# Patient Record
Sex: FEMALE | Race: WHITE | Employment: FULL TIME | URBAN - METROPOLITAN AREA
[De-identification: names, ages, dates, MRNs, and addresses within clinical notes are randomized per-mention and may not be internally consistent; named-entity substitution may affect disease eponyms.]

---

## 2019-01-28 ENCOUNTER — OFFICE VISIT (OUTPATIENT)
Dept: ENDOCRINOLOGY | Age: 34
End: 2019-01-28

## 2019-01-28 VITALS
SYSTOLIC BLOOD PRESSURE: 119 MMHG | HEART RATE: 90 BPM | WEIGHT: 168 LBS | BODY MASS INDEX: 28.68 KG/M2 | DIASTOLIC BLOOD PRESSURE: 67 MMHG | HEIGHT: 64 IN

## 2019-01-28 DIAGNOSIS — O99.280 HYPERTHYROIDISM AFFECTING PREGNANCY, ANTEPARTUM: Primary | ICD-10-CM

## 2019-01-28 DIAGNOSIS — E05.90 HYPERTHYROIDISM AFFECTING PREGNANCY, ANTEPARTUM: Primary | ICD-10-CM

## 2019-01-28 RX ORDER — DOXYLAMINE SUCCINATE AND PYRIDOXINE HYDROCHLORIDE 10; 10 MG/1; MG/1
TABLET, DELAYED RELEASE ORAL
Refills: 4 | COMMUNITY
Start: 2019-01-17 | End: 2021-05-24 | Stop reason: ALTCHOICE

## 2019-01-28 NOTE — PROGRESS NOTES
CONSULTATION REQUESTED BY: No primary care provider on file. REASON FOR CONSULT: Evaluation of hyperthyroidism    CHIEF COMPLAINT: Hyperthyroidism    HISTORY OF PRESENT ILLNESS:   Maruisz Crowe is a 34 y.o. female with a PMHx as noted below who was referred to our endocrinology clinic for evaluation of Hyperthyroidism. Patient describes that 18 weeks pregnant,  She notes she did have symptoms similar to hyperemesis gravidarum,   Patient reports a high degree of vomiting several weeks before,   She is on an entiemetic currently, noting her symptoms are improving,  Patient has no personal history of prior thyroid disorders,  Notably family history for thyroid disorders is positive in mother/aunt for graves/hashimoto thyroiditis,  Patient denies recent illness. She denies the use of any steroid or opioid medications. Patient denies palpitations, tremors, she did in the past however improved,  Lost weight when she was more ill a number of weeks back starting to [facility],  Blood pressure and heart rate today are normal.   Patient appears clinically euthyroid. Patient has no further concerns and is hoping to learn what is causing this thyroid dysfunction. Prior outside labs reviewed:   11/28/18: TSH 0.005  12/31/18: TSH 0.005    Review of most recent thyroid function:  No results found for: TSH, FT4, 445146, T3LT, 563016, TSILT, TRALT, TMCLT, TSHEXT   Thyroid Lab Key:  TSILT = Thyroid stimulating antibodies  TRALT = TSH Receptor Antibodies  TMCLT = TPO antibodies  T3LT = Total T3 levels  908335 = Direct FT4  512214 = Free T3    PAST MEDICAL/SURGICAL HISTORY:   No past medical history on file. No past surgical history on file.     ALLERGIES:   Allergies   Allergen Reactions    Pcn [Penicillins] Hives       MEDICATIONS ON ADMISSION:     Current Outpatient Medications:     DICLEGIS 10-10 mg TbEC DR tablet, TAKE 2 TABLETS BY MOUTH EVERY DAY AT BEDTIME ON EMPTY STOMACH, Disp: , Rfl: 4    prenatal vit calc,iron,folic (PRENATAL VITAMIN PO), Take  by mouth., Disp: , Rfl:     SOCIAL HISTORY:   Social History     Socioeconomic History    Marital status:      Spouse name: Not on file    Number of children: Not on file    Years of education: Not on file    Highest education level: Not on file   Social Needs    Financial resource strain: Not on file    Food insecurity - worry: Not on file    Food insecurity - inability: Not on file   [employer] needs - medical: Not on file   [employer] needs - non-medical: Not on file   Occupational History    Not on file   Tobacco Use    Smoking status: Never Smoker    Smokeless tobacco: Never Used   Substance and Sexual Activity    Alcohol use: No     Frequency: Never    Drug use: Not on file    Sexual activity: Not on file   Other Topics Concern    Not on file   Social History Narrative    Not on file       FAMILY HISTORY:  Family History   Problem Relation Age of Onset    Heart Disease Maternal Grandfather     Heart Disease Paternal Grandfather     Thyroid Disease Mother     Thyroid Disease Maternal Aunt        REVIEW OF SYSTEMS: Complete ROS assessed and noted for that which is described above, all else are negative. Eyes: normal  ENT: normal  CVS: normal  Resp: normal  GI: normal  : normal  GYN: normal  Endocrine: normal  Integument: normal  Musculoskeletal: normal  Neuro: normal  Psych: normal      PHYSICAL EXAMINATION:    VITAL SIGNS:  Visit Vitals  /67 (BP 1 Location: Left arm, BP Patient Position: Sitting)   Pulse 90   Ht 5' 4\" (1.626 m)   Wt 168 lb (76.2 kg)   BMI 28.84 kg/m²       GENERAL: NCAT, Sitting comfortably, NAD  EYES: EOMI, non-icteric, no proptosis  Ear/Nose/Throat: NCAT, no inflammation, thyroid gland is smooth and not enlarged, nodularity is not felt on palpation.    LYMPH NODES: No LAD  CARDIOVASCULAR: S1 S2, RRR, No murmur, 2+ radial pulses  RESPIRATORY: CTA b/l, no wheeze/rales  GASTROINTESTINAL: soft, NT, ND  MUSCULOSKELETAL: Normal ROM, no atrophy  SKIN: warm, no edema/rash/ or other skin changes  NEUROLOGIC: 5/5 power all extremities, AAOx3, no tremor   PSYCHIATRIC: Normal affect, Normal insight and judgement      REVIEW OF LABORATORY AND RADIOLOGY DATA:   Labs and documentation have been reviewed as described above. ASSESSMENT AND PLAN:   Jamin Tan is a 34 y.o. female with a PMHx as noted above who was referred to our endocrinology clinic for evaluation of hyperthyroidism. Hyperthyroidism affecting pregnancy    Based on the patients symptoms and thyroid function which I have reviewed, it does suggest hyperthyroidism. We have reviewed the general causes of hyperthyroidism together which include subacute thyroiditis, graves disease, and toxic nodules. We also discussed the general workup which is may involve confirming the abnormal labs, checking for antibodies, etc. We also discussed the various treatments. * In their particular case, she is noted for symptoms that are suggestive of hyperemesis gravidarum, notably also on an antiemetic. She describes that her symptoms are improving, while her blood pressure and heart rate today are normal. Clinically she appears euthyroid. Suspicion for physiologic hyperthyroidism of pregnancy is highest on the differential diagnosis list and we reviewed the causes for this using a drawing. We did discuss the need to evaluate for autoimmune thyroid disease also considering she has both graves disease and hashimoto disease in her family. Today we will obtain:  TSH, FT4, FT3, Thyroid receptor antibodies  I have advised the patient that based on these results which I will discuss with them by phone, we will determine the best next step to take. Blood pressure and heart rate are currently normal, no indication for beta blocker at this time. Plan to have patient RTC in 6 weeks, pre-labs to be ordered depending on findings and plan,    Rahat Page MD  [address] [place] [address]

## 2019-01-28 NOTE — PATIENT INSTRUCTIONS
Thyroid levels and antibodies today,     Plan for a follow up in about 6 weeks,     ( March 13 at 3:50 PM )    Call with concerns,     Paige Mccullough.  39 [facility]  [facility]

## 2019-01-29 LAB
T3FREE SERPL-MCNC: 6.2 PG/ML (ref 2–4.4)
T4 FREE SERPL-MCNC: 2.07 NG/DL (ref 0.82–1.77)
THYROGLOB AB SERPL-ACNC: 10.8 IU/ML (ref 0–0.9)
THYROPEROXIDASE AB SERPL-ACNC: 99 IU/ML (ref 0–34)
TSH SERPL DL<=0.005 MIU/L-ACNC: <0.006 UIU/ML (ref 0.45–4.5)
TSI ACT/NOR SER: 5.51 IU/L (ref 0–0.55)

## 2019-01-30 DIAGNOSIS — E05.00 GRAVES DISEASE: Primary | ICD-10-CM

## 2019-01-30 RX ORDER — METHIMAZOLE 10 MG/1
10 TABLET ORAL DAILY
Qty: 90 TAB | Refills: 3 | Status: SHIPPED | OUTPATIENT
Start: 2019-01-30 | End: 2019-05-03 | Stop reason: ALTCHOICE

## 2019-01-30 NOTE — PROGRESS NOTES
Thyroid levels elevated, FT4 2.07, FT3 6.2, TSH <0.006,  TSI + suggestive of graves disease,   TPO and TgAb positive also,   Recommend starting with 10mg of methimazole once daily, ideally to keep thyroid hormone levels on the higher side of normal, and avoid potential thyroid suppression,   Will have her repeat her levels at the lab in 4 weeks,   I called patient to advise her, she demonstrated her understanding,    Diego Stewart.  39 [facility] Endocrinology  [address] [place] [address]

## 2019-02-01 ENCOUNTER — TELEPHONE (OUTPATIENT)
Dept: ENDOCRINOLOGY | Age: 34
End: 2019-02-01

## 2019-02-01 NOTE — TELEPHONE ENCOUNTER
I returned this call and was able to answer some of her questions based on chart notes. However she wanted to know if taking 10 mg of Methimazole is ok as she is 18 weeks pregnant. I told her I would pass this on to Dr. Kerry Garcia and get back to her.   Rosetta Chang

## 2019-02-01 NOTE — TELEPHONE ENCOUNTER
Methimazole can be used safely with monitoring at her stage of pregnancy. Thanks,     Yanelis Landis.  39 [facility]  [facility]

## 2019-02-01 NOTE — TELEPHONE ENCOUNTER
I called Ms. Abdalla back and relayed the message from Dr. Leonora Salinas. She understood the information.   Melody Watkins

## 2019-02-01 NOTE — TELEPHONE ENCOUNTER
Patient called to ask some questions about taking Methimazole. She is pregnant and has some concerns. She can be reached at:  [phone].

## 2019-03-07 LAB
T3FREE SERPL-MCNC: 4.5 PG/ML (ref 2–4.4)
T4 FREE SERPL-MCNC: 1.55 NG/DL (ref 0.82–1.77)
TSH SERPL DL<=0.005 MIU/L-ACNC: <0.006 UIU/ML (ref 0.45–4.5)

## 2019-03-13 ENCOUNTER — OFFICE VISIT (OUTPATIENT)
Dept: ENDOCRINOLOGY | Age: 34
End: 2019-03-13

## 2019-03-13 VITALS
BODY MASS INDEX: 30.52 KG/M2 | HEIGHT: 64 IN | WEIGHT: 178.8 LBS | DIASTOLIC BLOOD PRESSURE: 75 MMHG | HEART RATE: 81 BPM | SYSTOLIC BLOOD PRESSURE: 175 MMHG

## 2019-03-13 DIAGNOSIS — E05.90 HYPERTHYROIDISM AFFECTING PREGNANCY, ANTEPARTUM: Primary | ICD-10-CM

## 2019-03-13 DIAGNOSIS — O99.280 HYPERTHYROIDISM AFFECTING PREGNANCY, ANTEPARTUM: Primary | ICD-10-CM

## 2019-03-13 NOTE — PROGRESS NOTES
CHIEF COMPLAINT: f/u evaluation for hyperthyroidism. HISTORY OF PRESENT ILLNESS:   Kadeem Sanchez is a 34 y.o. female with a PMHx as noted below who presents to the endocrinology clinic for f/u evaluation of hyperthyroidism. Presented for evaluation at 18 weeks gestation,  + TSI / TPO / TgAb 's, suggestive of graves disease,  We started her on 10 mg of methimazole once daily,  She has taken this as directed,  Thyroid levels are improving, however remains a bit elevated,  We had discussed previously aiming for upper limits of normal,  She reports feeling well, has no new concerns,  Denies symptoms of hyper or hypothyroidism,  Her palpitations have improved    Component      Latest Ref Rng & Units 3/6/2019 3/6/2019 3/6/2019          12:05 PM 12:05 PM 12:05 PM   TSH      0.450 - 4.500 uIU/mL   <0.006 (L)   T4, Free      0.82 - 1.77 ng/dL  1.55    Triiodothyronine (T3), free      2.0 - 4.4 pg/mL 4.5 (H)         PAST MEDICAL/SURGICAL HISTORY:   No past medical history on file. No past surgical history on file. ALLERGIES:   Allergies   Allergen Reactions    Pcn [Penicillins] Hives       MEDICATIONS ON ADMISSION:     Current Outpatient Medications:     methIMAzole (TAPAZOLE) 10 mg tablet, Take 1 Tab by mouth daily. , Disp: 90 Tab, Rfl: 3    prenatal vit calc,iron,folic (PRENATAL VITAMIN PO), Take  by mouth., Disp: , Rfl:     DICLEGIS 10-10 mg TbEC DR tablet, TAKE 2 TABLETS BY MOUTH EVERY DAY AT BEDTIME ON EMPTY STOMACH, Disp: , Rfl: 4    SOCIAL HISTORY:   Social History     Socioeconomic History    Marital status:      Spouse name: Not on file    Number of children: Not on file    Years of education: Not on file    Highest education level: Not on file   Social Needs    Financial resource strain: Not on file    Food insecurity - worry: Not on file    Food insecurity - inability: Not on file   [employer] needs - medical: Not on file   [employer] needs - non-medical: Not on file Occupational History    Not on file   Tobacco Use    Smoking status: Never Smoker    Smokeless tobacco: Never Used   Substance and Sexual Activity    Alcohol use: No     Frequency: Never    Drug use: Not on file    Sexual activity: Not on file   Other Topics Concern    Not on file   Social History Narrative    Not on file       FAMILY HISTORY:  Family History   Problem Relation Age of Onset    Heart Disease Maternal Grandfather     Heart Disease Paternal Grandfather     Thyroid Disease Mother     Thyroid Disease Maternal Aunt        REVIEW OF SYSTEMS: Complete ROS assessed and noted for that which is described above, all else are negative. Eyes: normal  ENT: normal  CVS: normal  Resp: normal  GI: normal  : normal  GYN: normal  Endocrine: normal  Integument: normal  Musculoskeletal: normal  Neuro: normal  Psych: normal      PHYSICAL EXAMINATION:    VITAL SIGNS:  Visit Vitals  /75   Pulse 81   Ht 5' 4\" (1.626 m)   Wt 178 lb 12.8 oz (81.1 kg)   BMI 30.69 kg/m²       GENERAL: NCAT, Sitting comfortably, NAD  EYES: EOMI, non-icteric, no proptosis  Ear/Nose/Throat: NCAT, no inflammation, thyroid gland enlarged  LYMPH NODES: No LAD  CARDIOVASCULAR: S1 S2, RRR, No murmur, 2+ radial pulses  RESPIRATORY: CTA b/l, no wheeze/rales  GASTROINTESTINAL: soft, NT, ND,  MUSCULOSKELETAL: Normal ROM, no atrophy  SKIN: warm, no edema/rash/ or other skin changes  NEUROLOGIC: 5/5 power all extremities, no tremors, AAOx3  PSYCHIATRIC: Normal affect, Normal insight and judgement    REVIEW OF LABORATORY AND RADIOLOGY DATA:   Labs and documentation have been reviewed as described above. ASSESSMENT AND PLAN:   Jewell Fink is a 34 y.o. female with a PMHx as noted above who presents to the endocrinology clinic for f/u evaluation of hyperthyroidism.      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 FT3 level still borderline elevated and we will increase her methimazole by just half a dose and repeat in about 6 weeks. She was welcomed to call with concerns. Increase methimazole to 15mg (1.5 tabs) each day,   F/u in 6 weeks with prelabs, to schedule next 3 visits today,    Rick Ozuna.  [address] [facility]

## 2019-03-13 NOTE — PATIENT INSTRUCTIONS
Increase methimazole to 15mg (1 and half tablets) each morning. Plan to return in about 6 weeks, with blood work 1 week before. Visit: MAY 3rd at 4:10 PM + 2 more appt's 6 weeks apart. Paige Mccullough.  39 [facility] Endocrin[facility]  [address] [place] [address]

## 2019-03-14 ENCOUNTER — OFFICE VISIT (OUTPATIENT)
Dept: URGENT CARE | Age: 34
End: 2019-03-14

## 2019-03-14 VITALS
RESPIRATION RATE: 18 BRPM | HEIGHT: 64 IN | SYSTOLIC BLOOD PRESSURE: 109 MMHG | DIASTOLIC BLOOD PRESSURE: 65 MMHG | TEMPERATURE: 98.5 F | HEART RATE: 79 BPM | WEIGHT: 180.9 LBS | OXYGEN SATURATION: 98 % | BODY MASS INDEX: 30.88 KG/M2

## 2019-03-14 DIAGNOSIS — Z20.828 EXPOSURE TO THE FLU: Primary | ICD-10-CM

## 2019-03-14 RX ORDER — OSELTAMIVIR PHOSPHATE 75 MG/1
75 CAPSULE ORAL DAILY
Qty: 10 CAP | Refills: 0 | Status: SHIPPED | OUTPATIENT
Start: 2019-03-14 | End: 2019-03-24

## 2019-03-14 NOTE — PROGRESS NOTES
Temi who is 24 weeks pregnant presents for Tamiflu prophylaxis treatment. Reports daughter dx'ed with Influenza yesterday, had 1 day of flu sx. Pt denies cough, congestion, runny nose, body aches, chills. Feels well. The history is provided by the patient. History reviewed. No pertinent past medical history. History reviewed. No pertinent surgical history. Family History   Problem Relation Age of Onset    Heart Disease Maternal Grandfather     Heart Disease Paternal Grandfather     Thyroid Disease Mother     Thyroid Disease Maternal Aunt         Social History     Socioeconomic History    Marital status:      Spouse name: Not on file    Number of children: Not on file    Years of education: Not on file    Highest education level: Not on file   Social Needs    Financial resource strain: Not on file    Food insecurity - worry: Not on file    Food insecurity - inability: Not on file   [employer] needs - medical: Not on file   [employer] needs - non-medical: Not on file   Occupational History    Not on file   Tobacco Use    Smoking status: Never Smoker    Smokeless tobacco: Never Used   Substance and Sexual Activity    Alcohol use: No     Frequency: Never    Drug use: Not on file    Sexual activity: Not on file   Other Topics Concern    Not on file   Social History Narrative    Not on file                ALLERGIES: Pcn [penicillins]    Review of Systems   Constitutional: Negative for activity change, appetite change, chills and fever. HENT: Negative for congestion, ear pain, rhinorrhea, sinus pressure, sinus pain, sore throat and trouble swallowing. Respiratory: Negative for cough, shortness of breath and wheezing. Cardiovascular: Negative for chest pain and palpitations. Gastrointestinal: Negative for nausea and vomiting. Musculoskeletal: Negative for myalgias. Neurological: Negative for dizziness and headaches.    Hematological: Negative for adenopathy. Vitals:    03/14/19 1150   BP: 109/65   Pulse: 79   Resp: 18   Temp: 98.5 °F (36.9 °C)   SpO2: 98%   Weight: 180 lb 14.4 oz (82.1 kg)   Height: 5' 4\" (1.626 m)       Physical Exam   Constitutional: She appears well-developed and well-nourished. No distress. Cardiovascular: Normal rate, regular rhythm and normal heart sounds. Pulmonary/Chest: Effort normal and breath sounds normal. No respiratory distress. She has no wheezes. She has no rales. Neurological: She is alert. Skin: She is not diaphoretic. Psychiatric: She has a normal mood and affect. Her behavior is normal. Judgment and thought content normal.   Nursing note and vitals reviewed. [facility]    ICD-10-CM ICD-9-CM    1. Exposure to the flu Z20.828 V01.79      Medications Ordered Today   Medications    oseltamivir (TAMIFLU) 75 mg capsule     Sig: Take 1 Cap by mouth daily for 10 days. Dispense:  10 Cap     Refill:  0     Fluids  Hand hygiene     The patient is to follow up with PCP prn. The patient is to take medications as prescribed.              Procedures

## 2019-04-27 LAB
T3FREE SERPL-MCNC: 2.9 PG/ML (ref 2–4.4)
T4 FREE SERPL-MCNC: 1.03 NG/DL (ref 0.82–1.77)
TSH SERPL DL<=0.005 MIU/L-ACNC: 0.01 UIU/ML (ref 0.45–4.5)

## 2019-05-03 ENCOUNTER — OFFICE VISIT (OUTPATIENT)
Dept: ENDOCRINOLOGY | Age: 34
End: 2019-05-03

## 2019-05-03 VITALS
BODY MASS INDEX: 32.95 KG/M2 | WEIGHT: 193 LBS | HEART RATE: 96 BPM | HEIGHT: 64 IN | DIASTOLIC BLOOD PRESSURE: 74 MMHG | SYSTOLIC BLOOD PRESSURE: 113 MMHG

## 2019-05-03 DIAGNOSIS — E05.90 HYPERTHYROIDISM AFFECTING PREGNANCY, ANTEPARTUM: Primary | ICD-10-CM

## 2019-05-03 DIAGNOSIS — O99.280 HYPERTHYROIDISM AFFECTING PREGNANCY, ANTEPARTUM: Primary | ICD-10-CM

## 2019-05-03 RX ORDER — METHIMAZOLE 5 MG/1
12.5 TABLET ORAL DAILY
Qty: 180 TAB | Refills: 3 | Status: SHIPPED | OUTPATIENT
Start: 2019-05-03 | End: 2020-02-21 | Stop reason: SDUPTHER

## 2019-05-03 NOTE — PROGRESS NOTES
CHIEF COMPLAINT: f/u evaluation for hyperthyroidism. HISTORY OF PRESENT ILLNESS:   Abby Jay is a 34 y.o. female with a PMHx as noted below who presents to the endocrinology clinic for f/u evaluation of hyperthyroidism. Presented for evaluation at 31 weeks gestation,  Will be having a baby girl,  + TSI / TPO / TgAb 's, suggestive of graves disease,  We started her on 10 mg of methimazole once daily,  On the last visit we increased her dose to 15mg once daily,  We had discussed previously aiming for upper limits of normal,  She reports feeling well, has no new concerns,  Denies symptoms of hyper or hypothyroidism,  Thyroid hormone levels are normal and stable, TSH is lagging behind,  No recent OB ultrasounds,    Component      Latest Ref Rng & Units 4/26/2019 4/26/2019 4/26/2019           8:31 AM  8:31 AM  8:31 AM   TSH      0.450 - 4.500 uIU/mL   0.009 (L)   T4, Free      0.82 - 1.77 ng/dL  1.03    Triiodothyronine (T3), free      2.0 - 4.4 pg/mL 2.9         PAST MEDICAL/SURGICAL HISTORY:   No past medical history on file. No past surgical history on file. ALLERGIES:   Allergies   Allergen Reactions    Pcn [Penicillins] Hives       MEDICATIONS ON ADMISSION:     Current Outpatient Medications:     methIMAzole (TAPAZOLE) 10 mg tablet, Take 1 Tab by mouth daily. (Patient taking differently: Take 15 mg by mouth daily.  Indications: overactive thyroid gland), Disp: 90 Tab, Rfl: 3    prenatal vit calc,iron,folic (PRENATAL VITAMIN PO), Take  by mouth., Disp: , Rfl:     DICLEGIS 10-10 mg TbEC DR tablet, TAKE 2 TABLETS BY MOUTH EVERY DAY AT BEDTIME ON EMPTY STOMACH, Disp: , Rfl: 4    SOCIAL HISTORY:   Social History     Socioeconomic History    Marital status:      Spouse name: Not on file    Number of children: Not on file    Years of education: Not on file    Highest education level: Not on file   Occupational History    Not on file   Social Needs    Financial resource strain: Not on file   Velta Ganser Food insecurity:     Worry: Not on file     Inability: Not on file    Transportation needs:     Medical: Not on file     Non-medical: Not on file   Tobacco Use    Smoking status: Never Smoker    Smokeless tobacco: Never Used   Substance and Sexual Activity    Alcohol use: No     Frequency: Never    Drug use: Not on file    Sexual activity: Not on file   Lifestyle    Physical activity:     Days per week: Not on file     Minutes per session: Not on file    Stress: Not on file   Relationships    Social connections:     Talks on phone: Not on file     Gets together: Not on file     Attends Denominational service: Not on file     Active member of club or organization: Not on file     Attends meetings of clubs or organizations: Not on file     Relationship status: Not on file    Intimate partner violence:     Fear of current or ex partner: Not on file     Emotionally abused: Not on file     Physically abused: Not on file     Forced sexual activity: Not on file   Other Topics Concern    Not on file   Social History Narrative    Not on file       FAMILY HISTORY:  Family History   Problem Relation Age of Onset    Heart Disease Maternal Grandfather     Heart Disease Paternal Grandfather     Thyroid Disease Mother     Thyroid Disease Maternal Aunt        REVIEW OF SYSTEMS: Complete ROS assessed and noted for that which is described above, all else are negative.   Eyes: normal  ENT: normal  CVS: normal  Resp: normal  GI: normal  : normal  GYN: normal  Endocrine: normal  Integument: normal  Musculoskeletal: normal  Neuro: normal  Psych: normal      PHYSICAL EXAMINATION:    VITAL SIGNS:  Visit Vitals  /74 (BP 1 Location: Left arm, BP Patient Position: Sitting)   Pulse 96   Ht 5' 4\" (1.626 m)   Wt 193 lb (87.5 kg)   BMI 33.13 kg/m²       GENERAL: NCAT, Sitting comfortably, NAD  EYES: EOMI, non-icteric, no proptosis  Ear/Nose/Throat: NCAT, no inflammation, thyroid gland enlarged  LYMPH NODES: No LAD  CARDIOVASCULAR: S1 S2, RRR, No murmur, 2+ radial pulses  RESPIRATORY: CTA b/l, no wheeze/rales  GASTROINTESTINAL: soft, NT, ND,  MUSCULOSKELETAL: Normal ROM, no atrophy  SKIN: warm, no edema/rash/ or other skin changes  NEUROLOGIC: 5/5 power all extremities, no tremors, AAOx3  PSYCHIATRIC: Normal affect, Normal insight and judgement    REVIEW OF LABORATORY AND RADIOLOGY DATA:   Labs and documentation have been reviewed as described above. ASSESSMENT AND PLAN:   Cynthia Singh is a 34 y.o. female with a PMHx as noted above who presents to the endocrinology clinic for f/u evaluation of hyperthyroidism. 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 We would like to avoid her thyroid levels coming down to the lower end of normal however and prefer them to be near the upper end of normal, and so we will plan as follows. Continue current dose of 15mg daily for 2 more weeks,   Then reduce to 12.5mg daily thereafter. ( 10mg tab + 1/2 of a 5mg tab ok,   Or 2.5 tabs of the 5mg tabs). New 5mg tabs ordered,  Patient can let us know if she cannot make the next appointment due to delivery,  Pre-labs ordered,     Dima MARSH  [address] [facility]

## 2019-05-03 NOTE — PATIENT INSTRUCTIONS
Continue current dose of 15mg daily for 2 more weeks,   Then reduce to 12.5mg daily thereafter. ( 10mg tab + 1/2 of a 5mg tab ok,   Or 2.5 tabs of the 5mg tabs). New 5mg tabs ordered,       Jhon Mendoza.  [address] [place] [address]

## 2019-06-08 LAB
T3FREE SERPL-MCNC: 2.2 PG/ML (ref 2–4.4)
T4 FREE SERPL-MCNC: 0.85 NG/DL (ref 0.82–1.77)
TSH SERPL DL<=0.005 MIU/L-ACNC: 1.71 UIU/ML (ref 0.45–4.5)

## 2019-06-14 ENCOUNTER — OFFICE VISIT (OUTPATIENT)
Dept: ENDOCRINOLOGY | Age: 34
End: 2019-06-14

## 2019-06-14 VITALS
WEIGHT: 197.2 LBS | HEART RATE: 79 BPM | DIASTOLIC BLOOD PRESSURE: 63 MMHG | SYSTOLIC BLOOD PRESSURE: 119 MMHG | BODY MASS INDEX: 33.67 KG/M2 | HEIGHT: 64 IN

## 2019-06-14 DIAGNOSIS — O99.280 HYPERTHYROIDISM AFFECTING PREGNANCY, ANTEPARTUM: Primary | ICD-10-CM

## 2019-06-14 DIAGNOSIS — E05.90 HYPERTHYROIDISM AFFECTING PREGNANCY, ANTEPARTUM: Primary | ICD-10-CM

## 2019-06-14 NOTE — PATIENT INSTRUCTIONS
Reduce methimazole to 10mg daily (5mg tabs x2) OR (10mg tab x1) for 3 weeks,     Then reduce further to 1 and a half tablets (7.5mg) daily thereafter, (using the 5mg tabs)    Can check thyroid labs post-partum in the hospital to assure stable TSH,    See you back on your next visit,       Hannah Mcintyre.  [address] [facility]  [address] [place] [address]

## 2019-06-14 NOTE — PROGRESS NOTES
CHIEF COMPLAINT: f/u evaluation for hyperthyroidism. HISTORY OF PRESENT ILLNESS:   Gisselle Horne is a 34 y.o. female with a PMHx as noted below who presents to the endocrinology clinic for f/u evaluation of hyperthyroidism. Presented for evaluation at 31 weeks gestation,  Will be having a baby girl, her 3rd child.   + TSI / TPO / TgAb 's, suggestive of graves disease,  We started her on methimazole with taper. Currently at 37 weeks gestation, Due date is July 1st,  On the last visit I provided her with the following instructions:   Continue current dose of 15mg daily for 2 more weeks,    Then reduce to 12.5mg daily thereafter. ( 10mg tab + 1/2 of a 5mg tab ok,   Or 2.5 tabs of the 5mg tabs). New 5mg tabs ordered,  We had discussed previously aiming for upper limits of normal,  She reports feeling well, has no new concerns,  Taking the 12.5mg daily we had discussed,  Denies symptoms of hyper or hypothyroidism,  Thyroid hormone levels are normal and stable,    Review of most recent thyroid function:  Lab Results   Component Value Date    TSH 1.710 06/07/2019    TSH 0.009 (L) 04/26/2019    TSH <0.006 (L) 03/06/2019    FT4 0.85 06/07/2019    FT4 1.03 04/26/2019    FT4 1.55 03/06/2019    TSILT 5.51 (H) 01/28/2019    TMCLT 99 (H) 01/28/2019    TGAB 10.8 (H) 01/28/2019      Thyroid Lab Key:  TSILT = Thyroid stimulating antibodies  TRALT = TSH Receptor Antibodies  TMCLT = TPO antibodies  T3LT = Total T3 levels  [id number] = Direct FT4  [id number] = Free T3      PAST MEDICAL/SURGICAL HISTORY:   No past medical history on file. No past surgical history on file. ALLERGIES:   Allergies   Allergen Reactions    Pcn [Penicillins] Hives       MEDICATIONS ON ADMISSION:     Current Outpatient Medications:     methIMAzole (TAPAZOLE) 5 mg tablet, Take 2.5 Tabs by mouth daily.  Indications: overactive thyroid gland, Disp: 180 Tab, Rfl: 3    prenatal vit calc,iron,folic (PRENATAL VITAMIN PO), Take  by mouth., Disp: , Rfl:   DICLEGIS 10-10 mg TbEC DR tablet, TAKE 2 TABLETS BY MOUTH EVERY DAY AT BEDTIME ON EMPTY STOMACH, Disp: , Rfl: 4    SOCIAL HISTORY:   Social History     Socioeconomic History    Marital status:      Spouse name: Not on file    Number of children: Not on file    Years of education: Not on file    Highest education level: Not on file   Occupational History    Not on file   Social Needs    Financial resource strain: Not on file    Food insecurity:     Worry: Not on file     Inability: Not on file    Transportation needs:     Medical: Not on file     Non-medical: Not on file   Tobacco Use    Smoking status: Never Smoker    Smokeless tobacco: Never Used   Substance and Sexual Activity    Alcohol use: No     Frequency: Never    Drug use: Not on file    Sexual activity: Not on file   Lifestyle    Physical activity:     Days per week: Not on file     Minutes per session: Not on file    Stress: Not on file   Relationships    Social connections:     Talks on phone: Not on file     Gets together: Not on file     Attends Restorationism service: Not on file     Active member of club or organization: Not on file     Attends meetings of clubs or organizations: Not on file     Relationship status: Not on file    Intimate partner violence:     Fear of current or ex partner: Not on file     Emotionally abused: Not on file     Physically abused: Not on file     Forced sexual activity: Not on file   Other Topics Concern    Not on file   Social History Narrative    Not on file       FAMILY HISTORY:  Family History   Problem Relation Age of Onset    Heart Disease Maternal Grandfather     Heart Disease Paternal Grandfather     Thyroid Disease Mother     Thyroid Disease Maternal Aunt        REVIEW OF SYSTEMS: Complete ROS assessed and noted for that which is described above, all else are negative.   Eyes: normal  ENT: normal  CVS: normal  Resp: normal  GI: normal  : normal  GYN: normal  Endocrine: normal  Integument: normal  Musculoskeletal: normal  Neuro: normal  Psych: normal      PHYSICAL EXAMINATION:    VITAL SIGNS:  Visit Vitals  /63   Pulse 79   Ht 5' 4\" (1.626 m)   Wt 197 lb 3.2 oz (89.4 kg)   BMI 33.85 kg/m²       GENERAL: NCAT, Sitting comfortably, NAD  EYES: EOMI, non-icteric, no proptosis  Ear/Nose/Throat: NCAT, no inflammation, thyroid gland mildly enlarged  LYMPH NODES: No LAD  CARDIOVASCULAR: S1 S2, RRR, No murmur, 2+ radial pulses  RESPIRATORY: CTA b/l, no wheeze/rales  GASTROINTESTINAL: soft, NT, ND,  MUSCULOSKELETAL: Normal ROM, no atrophy  SKIN: warm, no edema/rash/ or other skin changes  NEUROLOGIC: 5/5 power all extremities, no tremors, AAOx3  PSYCHIATRIC: Normal affect, Normal insight and judgement    REVIEW OF LABORATORY AND RADIOLOGY DATA:   Labs and documentation have been reviewed as described above. ASSESSMENT AND PLAN:   Jessica Cortes is a 34 y.o. female with a PMHx as noted above who presents to the endocrinology clinic for f/u evaluation of hyperthyroidism. 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 We discussed the use of methimazole in breast feeding mothers. We discussed our plan for surveillance and treatment in the post partum period in general.    Reduce methimazole to 10mg daily (5mg tabs x2) OR (10mg tab x1) for 3 weeks,   Then reduce further to 1 and a half tablets (7.5mg) daily thereafter,  Can check thyroid labs post-partum in the hospital to assure stable TSH,    Pre-labs ordered,    25 minutes spent together with patient today of which >50% of this time was spent in counseling and coordination of care. Favian Alvarez.  [address] [facility]

## 2019-07-23 LAB
T3 SERPL-MCNC: 106 NG/DL (ref 71–180)
T4 FREE SERPL-MCNC: 1 NG/DL (ref 0.82–1.77)
TSH SERPL DL<=0.005 MIU/L-ACNC: 0.04 UIU/ML (ref 0.45–4.5)

## 2019-07-26 ENCOUNTER — OFFICE VISIT (OUTPATIENT)
Dept: ENDOCRINOLOGY | Age: 34
End: 2019-07-26

## 2019-07-26 VITALS
DIASTOLIC BLOOD PRESSURE: 67 MMHG | HEIGHT: 64 IN | WEIGHT: 181.9 LBS | HEART RATE: 67 BPM | RESPIRATION RATE: 12 BRPM | BODY MASS INDEX: 31.05 KG/M2 | SYSTOLIC BLOOD PRESSURE: 108 MMHG | OXYGEN SATURATION: 98 %

## 2019-07-26 DIAGNOSIS — E05.90 HYPERTHYROIDISM AFFECTING PREGNANCY, ANTEPARTUM: Primary | ICD-10-CM

## 2019-07-26 DIAGNOSIS — O99.280 HYPERTHYROIDISM AFFECTING PREGNANCY, ANTEPARTUM: Primary | ICD-10-CM

## 2019-07-26 NOTE — PROGRESS NOTES
Tyler Manzanares is a 34 y.o. female    Chief Complaint   Patient presents with    Thyroid Problem    Follow-up     1. Have you been to the ER, urgent care clinic since your last visit? Hospitalized since your last visit? No    2. Have you seen or consulted any other health care providers outside of the [address] [place] [address] since your last visit? Include any pap smears or colon screening.  No

## 2019-07-26 NOTE — PROGRESS NOTES
CHIEF COMPLAINT: f/u evaluation for hyperthyroidism. HISTORY OF PRESENT ILLNESS:   Irene Aguilar is a 34 y.o. female with a PMHx as noted below who presents to the endocrinology clinic for f/u evaluation of hyperthyroidism.    + TSI / TPO / TgAb 's, suggestive of graves disease, in pregnancy,  We started her on methimazole with taper. Had her baby girl June 25th, Zoe,  On the last visit I provided her with the following instructions:   Reduce methimazole to 10mg daily (5mg tabs x2) OR (10mg tab x1) for 3 weeks,    Then reduce further to 1 and a half tablets (7.5mg) daily thereafter,  Review of labs shows low TSH but controlled FT4/T3 which does not well matched,  Patient reports that it is possible that a dose was missed here or there in the past 2 weeks,  She has tapered her medication as directed,  Denies symptoms of hyper or hypothyroidism,  Review of most recent thyroid function:  Lab Results   Component Value Date    TSH 0.040 (L) 07/22/2019    TSH 1.710 06/07/2019    TSH 0.009 (L) 04/26/2019    FT4 1.00 07/22/2019    FT4 0.85 06/07/2019    FT4 1.03 04/26/2019    T3LT 106 07/22/2019    TSILT 5.51 (H) 01/28/2019    TMCLT 99 (H) 01/28/2019    TGAB 10.8 (H) 01/28/2019      Thyroid Lab Key:  TSILT = Thyroid stimulating antibodies  TRALT = TSH Receptor Antibodies  TMCLT = TPO antibodies  T3LT = Total T3 levels  586997 = Direct FT4  [id number] = Free T3      PAST MEDICAL/SURGICAL HISTORY:   History reviewed. No pertinent past medical history. History reviewed. No pertinent surgical history. ALLERGIES:   Allergies   Allergen Reactions    Pcn [Penicillins] Hives       MEDICATIONS ON ADMISSION:     Current Outpatient Medications:     methIMAzole (TAPAZOLE) 5 mg tablet, Take 2.5 Tabs by mouth daily. Indications: overactive thyroid gland (Patient taking differently: Take 7.5 mg by mouth daily.  Indications: overactive thyroid gland), Disp: 180 Tab, Rfl: 3    prenatal vit calc,iron,folic (PRENATAL VITAMIN PO), Take by mouth., Disp: , Rfl:     DICLEGIS 10-10 mg TbEC DR tablet, TAKE 2 TABLETS BY MOUTH EVERY DAY AT BEDTIME ON EMPTY STOMACH, Disp: , Rfl: 4    SOCIAL HISTORY:   Social History     Socioeconomic History    Marital status:      Spouse name: Not on file    Number of children: Not on file    Years of education: Not on file    Highest education level: Not on file   Occupational History    Not on file   Social Needs    Financial resource strain: Not on file    Food insecurity:     Worry: Not on file     Inability: Not on file    Transportation needs:     Medical: Not on file     Non-medical: Not on file   Tobacco Use    Smoking status: Never Smoker    Smokeless tobacco: Never Used   Substance and Sexual Activity    Alcohol use: No     Frequency: Never    Drug use: Not on file    Sexual activity: Not on file   Lifestyle    Physical activity:     Days per week: Not on file     Minutes per session: Not on file    Stress: Not on file   Relationships    Social connections:     Talks on phone: Not on file     Gets together: Not on file     Attends Alevism service: Not on file     Active member of club or organization: Not on file     Attends meetings of clubs or organizations: Not on file     Relationship status: Not on file    Intimate partner violence:     Fear of current or ex partner: Not on file     Emotionally abused: Not on file     Physically abused: Not on file     Forced sexual activity: Not on file   Other Topics Concern    Not on file   Social History Narrative    Not on file       FAMILY HISTORY:  Family History   Problem Relation Age of Onset    Heart Disease Maternal Grandfather     Heart Disease Paternal Grandfather     Thyroid Disease Mother     Thyroid Disease Maternal Aunt        REVIEW OF SYSTEMS: Complete ROS assessed and noted for that which is described above, all else are negative.   Eyes: normal  ENT: normal  CVS: normal  Resp: normal  GI: normal  : normal  GYN: normal  Endocrine: normal  Integument: normal  Musculoskeletal: normal  Neuro: normal  Psych: normal      PHYSICAL EXAMINATION:    VITAL SIGNS:  Visit Vitals  /67 (BP 1 Location: Left arm, BP Patient Position: Sitting)   Pulse 67   Resp 12   Ht 5' 4\" (1.626 m)   Wt 181 lb 14.4 oz (82.5 kg)   SpO2 98%   Breastfeeding? Unknown   BMI 31.22 kg/m²       GENERAL: NCAT, Sitting comfortably, NAD  EYES: EOMI, non-icteric, no proptosis  Ear/Nose/Throat: NCAT, no inflammation, thyroid gland not as large as before  LYMPH NODES: No LAD  CARDIOVASCULAR: S1 S2, RRR, No murmur, 2+ radial pulses  RESPIRATORY: CTA b/l, no wheeze/rales  GASTROINTESTINAL: soft, NT, ND,  MUSCULOSKELETAL: Normal ROM, no atrophy  SKIN: warm, no edema/rash/ or other skin changes  NEUROLOGIC: 5/5 power all extremities, no tremors, AAOx3  PSYCHIATRIC: Normal affect, Normal insight and judgement    REVIEW OF LABORATORY AND RADIOLOGY DATA:   Labs and documentation have been reviewed as described above. ASSESSMENT AND PLAN:   Keisha Mancera is a 34 y.o. female with a PMHx as noted above who presents to the endocrinology clinic for f/u evaluation of hyperthyroidism. 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 She has mostly been taking her tablets as far as she can recall, though having reviewed her labs together we noticed that her thyroid levels are controlled though her TSH has come into the low range again. It is an atypical pattern which is often suggestive of a missed dose here and there in the past weeks. Often the TSH will get low due to surge of thyroid hormone when a dose may be missed, however when medication is resumed, levels can get under control with a delay in the TSH recovery presenting this way.  What we will do in this case, as she is asymptomatic and FT4/TT3 are on the lower side of normal, is repeat her levels in 3 weeks as a checkpoint and at that time we will make any further adjustments if needed. 1 and a half tablets (7.5mg) daily,  Check point lab in 3 weeks, will review and advise,   Will order prelabs for next visit after reviewing the 3 week lab,  RTC in September as discussed,    Candance Shih T.  [address] [facility]

## 2019-07-26 NOTE — PATIENT INSTRUCTIONS
1 and a half tablets (7.5mg) daily,      labs in 3 weeks, I will touch base with results,     Keep current September appt, see you then,     Nahum Albert.  [address]

## 2019-08-17 LAB
T3FREE SERPL-MCNC: 2.4 PG/ML (ref 2–4.4)
T4 FREE SERPL-MCNC: 0.97 NG/DL (ref 0.82–1.77)
TSH SERPL DL<=0.005 MIU/L-ACNC: 1.61 UIU/ML (ref 0.45–4.5)

## 2019-08-26 DIAGNOSIS — E05.90 HYPERTHYROIDISM AFFECTING PREGNANCY, ANTEPARTUM: Primary | ICD-10-CM

## 2019-08-26 DIAGNOSIS — O99.280 HYPERTHYROIDISM AFFECTING PREGNANCY, ANTEPARTUM: Primary | ICD-10-CM

## 2019-08-26 NOTE — PROGRESS NOTES
1.5 tabs of methimazole daily, labs remain normal, will notify patient through mychart and order prelabs for her next visit,     Yamile Peña.  [address] [facility]  [address] [place] [address]

## 2019-09-17 LAB
T3 SERPL-MCNC: 84 NG/DL (ref 71–180)
T4 FREE SERPL-MCNC: 1.03 NG/DL (ref 0.82–1.77)
TSH SERPL DL<=0.005 MIU/L-ACNC: 2.49 UIU/ML (ref 0.45–4.5)

## 2019-09-20 ENCOUNTER — OFFICE VISIT (OUTPATIENT)
Dept: ENDOCRINOLOGY | Age: 34
End: 2019-09-20

## 2019-09-20 VITALS
HEIGHT: 64 IN | BODY MASS INDEX: 31.07 KG/M2 | SYSTOLIC BLOOD PRESSURE: 96 MMHG | HEART RATE: 70 BPM | WEIGHT: 182 LBS | DIASTOLIC BLOOD PRESSURE: 58 MMHG

## 2019-09-20 DIAGNOSIS — E05.90 HYPERTHYROIDISM AFFECTING PREGNANCY, ANTEPARTUM: Primary | ICD-10-CM

## 2019-09-20 DIAGNOSIS — O99.280 HYPERTHYROIDISM AFFECTING PREGNANCY, ANTEPARTUM: Primary | ICD-10-CM

## 2019-09-20 RX ORDER — NORETHINDRONE 0.35 MG/1
TABLET ORAL
Refills: 3 | COMMUNITY
Start: 2019-09-03 | End: 2021-05-24 | Stop reason: ALTCHOICE

## 2019-09-20 NOTE — PROGRESS NOTES
CHIEF COMPLAINT: f/u evaluation for hyperthyroidism. HISTORY OF PRESENT ILLNESS:   Darlin Pinzon is a 30 y.o. female with a PMHx as noted below who presents to the endocrinology clinic for f/u evaluation of hyperthyroidism.    + TSI / TPO / TgAb 's, suggestive of graves disease, initially noted in pregnancy. Had her baby girl June 25th, Zoe,  We had started her on methimazole with taper. On the last visit I provided her with the following instructions:   Methimazole 1 and a half tablets (7.5mg) daily thereafter,  Review of labs shows normal TSH and FT4/TT3. She has been taking her medication as directed,  Denies symptoms of hyper or hypothyroidism,  Review of most recent thyroid function:  Lab Results   Component Value Date    TSH 2.490 09/16/2019    TSH 1.610 08/16/2019    TSH 0.040 (L) 07/22/2019    FT4 1.03 09/16/2019    FT4 0.97 08/16/2019    FT4 1.00 07/22/2019    T3LT 84 09/16/2019    T3LT 106 07/22/2019    TSILT 5.51 (H) 01/28/2019    TMCLT 99 (H) 01/28/2019    TGAB 10.8 (H) 01/28/2019      Thyroid Lab Key:  TSILT = Thyroid stimulating antibodies  TRALT = TSH Receptor Antibodies  TMCLT = TPO antibodies  T3LT = Total T3 levels  636947 = Direct FT4  [id number] = Free T3      PAST MEDICAL/SURGICAL HISTORY:   No past medical history on file. No past surgical history on file. ALLERGIES:   Allergies   Allergen Reactions    Pcn [Penicillins] Hives       MEDICATIONS ON ADMISSION:     Current Outpatient Medications:     MAURICIO 0.35 mg tab, TAKE 1 TABLET BY MOUTH EVERY DAY, Disp: , Rfl: 3    methIMAzole (TAPAZOLE) 5 mg tablet, Take 2.5 Tabs by mouth daily. Indications: overactive thyroid gland (Patient taking differently: Take 7.5 mg by mouth daily.  Indications: overactive thyroid gland), Disp: 180 Tab, Rfl: 3    prenatal vit calc,iron,folic (PRENATAL VITAMIN PO), Take  by mouth., Disp: , Rfl:     DICLEGIS 10-10 mg TbEC DR tablet, TAKE 2 TABLETS BY MOUTH EVERY DAY AT BEDTIME ON EMPTY STOMACH, Disp: , Rfl: 4    SOCIAL HISTORY:   Social History     Socioeconomic History    Marital status:      Spouse name: Not on file    Number of children: Not on file    Years of education: Not on file    Highest education level: Not on file   Occupational History    Not on file   Social Needs    Financial resource strain: Not on file    Food insecurity:     Worry: Not on file     Inability: Not on file    Transportation needs:     Medical: Not on file     Non-medical: Not on file   Tobacco Use    Smoking status: Never Smoker    Smokeless tobacco: Never Used   Substance and Sexual Activity    Alcohol use: No     Frequency: Never    Drug use: Not on file    Sexual activity: Not on file   Lifestyle    Physical activity:     Days per week: Not on file     Minutes per session: Not on file    Stress: Not on file   Relationships    Social connections:     Talks on phone: Not on file     Gets together: Not on file     Attends Buddhism service: Not on file     Active member of club or organization: Not on file     Attends meetings of clubs or organizations: Not on file     Relationship status: Not on file    Intimate partner violence:     Fear of current or ex partner: Not on file     Emotionally abused: Not on file     Physically abused: Not on file     Forced sexual activity: Not on file   Other Topics Concern    Not on file   Social History Narrative    Not on file       FAMILY HISTORY:  Family History   Problem Relation Age of Onset    Heart Disease Maternal Grandfather     Heart Disease Paternal Grandfather     Thyroid Disease Mother     Thyroid Disease Maternal Aunt        REVIEW OF SYSTEMS: Complete ROS assessed and noted for that which is described above, all else are negative.   Eyes: normal  ENT: normal  CVS: normal  Resp: normal  GI: normal  : normal  GYN: normal  Endocrine: normal  Integument: normal  Musculoskeletal: normal  Neuro: normal  Psych: normal      PHYSICAL EXAMINATION:    VITAL SIGNS:  Visit Vitals  BP 96/58 (BP 1 Location: Left arm, BP Patient Position: Sitting)   Pulse 70   Ht 5' 4\" (1.626 m)   Wt 182 lb (82.6 kg)   BMI 31.24 kg/m²       GENERAL: NCAT, Sitting comfortably, NAD  EYES: EOMI, non-icteric, no proptosis  Ear/Nose/Throat: NCAT, no inflammation, thyroid gland not as large as before  LYMPH NODES: No LAD  CARDIOVASCULAR: S1 S2, RRR, No murmur, 2+ radial pulses  RESPIRATORY: CTA b/l, no wheeze/rales  GASTROINTESTINAL: soft, NT, ND,  MUSCULOSKELETAL: Normal ROM, no atrophy  SKIN: warm, no edema/rash/ or other skin changes  NEUROLOGIC: 5/5 power all extremities, no tremors, AAOx3  PSYCHIATRIC: Normal affect, Normal insight and judgement    REVIEW OF LABORATORY AND RADIOLOGY DATA:   Labs and documentation have been reviewed as described above. ASSESSMENT AND PLAN:   Mike Tristan is a 30 y.o. female with a PMHx as noted above who presents to the endocrinology clinic for f/u evaluation of hyperthyroidism. 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

## 2019-09-20 NOTE — PATIENT INSTRUCTIONS
1 and a half tablets (7.5mg) daily for 2 more months,     Then reduce to 1 tab daily thereafter    Return to clinic in 3 months, labs to be completed 2-3 days prior,    Call with concerns,     Judi HARVEY.  [address] [facility]  [address] [place] [address]

## 2020-01-18 LAB
T3 SERPL-MCNC: 104 NG/DL (ref 71–180)
T4 FREE SERPL-MCNC: 1.21 NG/DL (ref 0.82–1.77)
TSH SERPL DL<=0.005 MIU/L-ACNC: 0.37 UIU/ML (ref 0.45–4.5)

## 2020-02-21 ENCOUNTER — OFFICE VISIT (OUTPATIENT)
Dept: ENDOCRINOLOGY | Age: 35
End: 2020-02-21

## 2020-02-21 VITALS
BODY MASS INDEX: 31.76 KG/M2 | HEIGHT: 64 IN | WEIGHT: 186 LBS | DIASTOLIC BLOOD PRESSURE: 70 MMHG | HEART RATE: 69 BPM | SYSTOLIC BLOOD PRESSURE: 108 MMHG

## 2020-02-21 DIAGNOSIS — O99.280 HYPERTHYROIDISM AFFECTING PREGNANCY, ANTEPARTUM: Primary | ICD-10-CM

## 2020-02-21 DIAGNOSIS — E05.90 HYPERTHYROIDISM AFFECTING PREGNANCY, ANTEPARTUM: Primary | ICD-10-CM

## 2020-02-21 RX ORDER — METHIMAZOLE 5 MG/1
10 TABLET ORAL DAILY
Qty: 180 TAB | Refills: 3 | Status: SHIPPED | OUTPATIENT
Start: 2020-02-21 | End: 2020-03-05 | Stop reason: SDUPTHER

## 2020-02-21 NOTE — PATIENT INSTRUCTIONS
10mg (2 tabs) once daily for 4 weeks, then,    1 and a half tablets (7.5mg) daily thereafter,    Return to clinic in 3 months, labs to be completed 2-3 days prior,    Idalia MARSH  [address] [facility]  [employer]

## 2020-02-21 NOTE — PROGRESS NOTES
CHIEF COMPLAINT: f/u evaluation for hyperthyroidism. HISTORY OF PRESENT ILLNESS:   Shira Murillo is a 30 y.o. female with a PMHx as noted below who presents to the endocrinology clinic for f/u evaluation of hyperthyroidism.    + TSI / TPO / TgAb 's, suggestive of graves disease, initially noted in pregnancy. We had started her on methimazole with taper. Had her baby girl , Kathy,  She was last seen in 2019, had recommended 3 month f/u at the time,  On that visit I had recommended the followin and a half tablets (7.5mg) daily for 2 more months,    Then reduce to 1 tab daily thereafter  Review of labs from last month shows a low TSH,  She reports she missed a few doses over the holidays,  Denies symptoms of hyper or hypothyroidism,  Review of most recent thyroid function:  Lab Results   Component Value Date    TSH 0.367 (L) 2020    TSH 2.490 2019    TSH 1.610 2019    FT4 1.21 2020    FT4 1.03 2019    FT4 0.97 2019    T3LT 104 2020    T3LT 84 2019    T3LT 106 2019    TSILT 5.51 (H) 2019    TMCLT 99 (H) 2019    TGAB 10.8 (H) 2019      Thyroid Lab Key:  TSILT = Thyroid stimulating antibodies  TRALT = TSH Receptor Antibodies  TMCLT = TPO antibodies  T3LT = Total T3 levels  605890 = Direct FT4  914184 = Free T3      PAST MEDICAL/SURGICAL HISTORY:   No past medical history on file. No past surgical history on file. ALLERGIES:   Allergies   Allergen Reactions    Pcn [Penicillins] Hives       MEDICATIONS ON ADMISSION:     Current Outpatient Medications:     norethindrone (CHANTALE PO), Take  by mouth., Disp: , Rfl:     methIMAzole (TAPAZOLE) 5 mg tablet, Take 2.5 Tabs by mouth daily. Indications: overactive thyroid gland (Patient taking differently: Take 5 mg by mouth daily.  Indications: overactive thyroid gland), Disp: 180 Tab, Rfl: 3    prenatal vit calc,iron,folic (PRENATAL VITAMIN PO), Take  by mouth., Disp: , Rfl:     MAURICIO 0.35 mg tab, TAKE 1 TABLET BY MOUTH EVERY DAY, Disp: , Rfl: 3    DICLEGIS 10-10 mg TbEC DR tablet, TAKE 2 TABLETS BY MOUTH EVERY DAY AT BEDTIME ON EMPTY STOMACH, Disp: , Rfl: 4    SOCIAL HISTORY:   Social History     Socioeconomic History    Marital status:      Spouse name: Not on file    Number of children: Not on file    Years of education: Not on file    Highest education level: Not on file   Occupational History    Not on file   Social Needs    Financial resource strain: Not on file    Food insecurity:     Worry: Not on file     Inability: Not on file    Transportation needs:     Medical: Not on file     Non-medical: Not on file   Tobacco Use    Smoking status: Never Smoker    Smokeless tobacco: Never Used   Substance and Sexual Activity    Alcohol use: No     Frequency: Never    Drug use: Not on file    Sexual activity: Not on file   Lifestyle    Physical activity:     Days per week: Not on file     Minutes per session: Not on file    Stress: Not on file   Relationships    Social connections:     Talks on phone: Not on file     Gets together: Not on file     Attends Jainism service: Not on file     Active member of club or organization: Not on file     Attends meetings of clubs or organizations: Not on file     Relationship status: Not on file    Intimate partner violence:     Fear of current or ex partner: Not on file     Emotionally abused: Not on file     Physically abused: Not on file     Forced sexual activity: Not on file   Other Topics Concern    Not on file   Social History Narrative    Not on file       FAMILY HISTORY:  Family History   Problem Relation Age of Onset    Heart Disease Maternal Grandfather     Heart Disease Paternal Grandfather     Thyroid Disease Mother     Thyroid Disease Maternal Aunt        REVIEW OF SYSTEMS: Complete ROS assessed and noted for that which is described above, all else are negative.   Eyes: normal  ENT: normal  CVS: normal  Resp: normal  GI: normal  : normal  GYN: normal  Endocrine: normal  Integument: normal  Musculoskeletal: normal  Neuro: normal  Psych: normal      PHYSICAL EXAMINATION:    VITAL SIGNS:  Visit Vitals  /70 (BP 1 Location: Left arm, BP Patient Position: Sitting)   Pulse 69   Ht 5' 4\" (1.626 m)   Wt 186 lb (84.4 kg)   BMI 31.93 kg/m²       GENERAL: NCAT, Sitting comfortably, NAD  EYES: EOMI, non-icteric, no proptosis  Ear/Nose/Throat: NCAT, no inflammation, thyroid gland not as large as before  LYMPH NODES: No LAD  CARDIOVASCULAR: S1 S2, RRR, No murmur, 2+ radial pulses  RESPIRATORY: CTA b/l, no wheeze/rales  GASTROINTESTINAL: soft, NT, ND,  MUSCULOSKELETAL: Normal ROM, no atrophy  SKIN: warm, no edema/rash/ or other skin changes  NEUROLOGIC: 5/5 power all extremities, no tremors, AAOx3  PSYCHIATRIC: Normal affect, Normal insight and judgement    REVIEW OF LABORATORY AND RADIOLOGY DATA:   Labs and documentation have been reviewed as described above. ASSESSMENT AND PLAN:   Magdaleno Velarde is a 30 y.o. female with a PMHx as noted above who presents to the endocrinology clinic for f/u evaluation of hyperthyroidism. 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 We will get back on track to promote remission. Discussed importance of taking each AM. Note that she has + TPO and TgAb in addition to TSI therefor even after coming off methimazole in the future, she is at risk later for both recurrence of graves disease in addition to hypothyroidism due to chronic thyroiditis. We discussed this today in addition the causes of hairloss in the setting of thyroid dysfunction. 10mg (2 tabs) once daily for 4 weeks, then,  1 and a half tablets (7.5mg) daily thereafter,  Return to clinic in 3 months, labs to be completed 2-3 days prior,  Call with concerns,      Cecy MARSH  [address] [facility]

## 2020-03-05 RX ORDER — METHIMAZOLE 5 MG/1
10 TABLET ORAL DAILY
Qty: 180 TAB | Refills: 3 | Status: SHIPPED | OUTPATIENT
Start: 2020-03-05 | End: 2020-03-05 | Stop reason: SDUPTHER

## 2020-03-05 RX ORDER — METHIMAZOLE 5 MG/1
10 TABLET ORAL DAILY
Qty: 180 TAB | Refills: 3 | Status: SHIPPED | OUTPATIENT
Start: 2020-03-05 | End: 2021-03-26 | Stop reason: SDUPTHER

## 2021-03-16 LAB — LDL-C, EXTERNAL: 49

## 2021-03-26 ENCOUNTER — VIRTUAL VISIT (OUTPATIENT)
Dept: ENDOCRINOLOGY | Age: 36
End: 2021-03-26
Payer: COMMERCIAL

## 2021-03-26 DIAGNOSIS — E05.90 HYPERTHYROIDISM: Primary | ICD-10-CM

## 2021-03-26 DIAGNOSIS — E05.90 HYPERTHYROIDISM: ICD-10-CM

## 2021-03-26 PROCEDURE — 99214 OFFICE O/P EST MOD 30 MIN: CPT | Performed by: INTERNAL MEDICINE

## 2021-03-26 RX ORDER — BISMUTH SUBSALICYLATE 262 MG
1 TABLET,CHEWABLE ORAL DAILY
COMMUNITY
End: 2022-02-14 | Stop reason: ALTCHOICE

## 2021-03-26 RX ORDER — METHIMAZOLE 10 MG/1
TABLET ORAL
Qty: 360 TAB | Refills: 3 | Status: SHIPPED | OUTPATIENT
Start: 2021-03-26 | End: 2021-05-24 | Stop reason: SDUPTHER

## 2021-03-26 NOTE — PROGRESS NOTES
**DUE TO PANDEMIC AND HEALTH CONCERNS IN THE COMMUNITY, THIS PATIENT WAS EITHER ILL OR FOUND TO BE HIGH RISK FOR IN-PERSON EVALUATION WITHIN THE CLINIC. THE FOLLOWING IS A VIRTUAL TELEMEDICINE VIDEO ENCOUNTER VIA [employer], TO WHICH THE PATIENT AGREED. THE PURPOSE IS TO LIMIT INTERRUPTIONS IN HEALTHCARE AND TO PROVIDE FOR ONGOING URGENT NEEDS UNDER THE CURRENT CONDITIONS. CHIEF COMPLAINT: f/u evaluation for hyperthyroidism. HISTORY OF PRESENT ILLNESS:   Arsenio Stuart is a 37 y.o. female with a PMHx as noted below who presents to the endocrinology clinic for f/u evaluation of hyperthyroidism.    + TSI / TPO / TgAb 's, suggestive of graves disease, initially noted in pregnancy. We had started her on methimazole with taper.   Previously had a baby girl June 25th, Zoe,  She was last seen over 1 year ago  On that visit I had recommended the following:    10mg (2 tabs) once daily for 4 weeks, then,   1 and a half tablets (7.5mg) daily thereafter,  Last summer she did not continue the methimazole,  In Nov 2020 she started eating healthy and working out,  She lost a lot of weight however,   Her PCP obtained labs a few weeks ago,   She was started on methimazole 20 mg once daily, using 5mg tabs,  Her current weight is 137,   Obtaining outside labs:   TSH: 0.005, FT4 6.10  Symptoms: had palpitations and tremors which are improving however,    Review of most recent thyroid function:  Lab Results   Component Value Date    TSH 0.367 (L) 01/17/2020    TSH 2.490 09/16/2019    TSH 1.610 08/16/2019    FT4 1.21 01/17/2020    FT4 1.03 09/16/2019    FT4 0.97 08/16/2019    T3LT 104 01/17/2020    T3LT 84 09/16/2019    T3LT 106 07/22/2019    TSILT 5.51 (H) 01/28/2019    TMCLT 99 (H) 01/28/2019    TGAB 10.8 (H) 01/28/2019      Thyroid Lab Key:  TSILT = Thyroid stimulating antibodies  TRALT = TSH Receptor Antibodies  TMCLT = TPO antibodies  T3LT = Total T3 levels  157895 = Direct FT4  129918 = Free T3    PAST MEDICAL/SURGICAL HISTORY: No past medical history on file. No past surgical history on file. ALLERGIES:   Allergies   Allergen Reactions    Pcn [Penicillins] Hives       MEDICATIONS ON ADMISSION:     Current Outpatient Medications:     multivitamin (ONE A DAY) tablet, Take 1 Tab by mouth daily. , Disp: , Rfl:     OTHER, Black Current Oil, Disp: , Rfl:     methIMAzole (TAPAZOLE) 5 mg tablet, Take 2 Tabs by mouth daily. Indications: overactive thyroid gland (Patient taking differently: Take 20 mg by mouth daily. Indications: overactive thyroid gland), Disp: 180 Tab, Rfl: 3    norethindrone (CHANTALE PO), Take  by mouth., Disp: , Rfl:     MAURICIO 0.35 mg tab, TAKE 1 TABLET BY MOUTH EVERY DAY, Disp: , Rfl: 3    DICLEGIS 10-10 mg TbEC DR tablet, TAKE 2 TABLETS BY MOUTH EVERY DAY AT BEDTIME ON EMPTY STOMACH, Disp: , Rfl: 4    prenatal vit calc,iron,folic (PRENATAL VITAMIN PO), Take  by mouth., Disp: , Rfl:     norelgestromin-ethinyl estradiol (ORTHO EVRA) 150-35 mcg/24 hr, 1 Patch by TransDERmal route Every Saturday. , Disp: , Rfl:     SOCIAL HISTORY:   Social History     Socioeconomic History    Marital status:      Spouse name: Not on file    Number of children: Not on file    Years of education: Not on file    Highest education level: Not on file   Occupational History    Not on file   Social Needs    Financial resource strain: Not on file    Food insecurity     Worry: Not on file     Inability: Not on file    Transportation needs     Medical: Not on file     Non-medical: Not on file   Tobacco Use    Smoking status: Never Smoker    Smokeless tobacco: Never Used   Substance and Sexual Activity    Alcohol use: No     Frequency: Never    Drug use: Not on file    Sexual activity: Not on file   Lifestyle    Physical activity     Days per week: Not on file     Minutes per session: Not on file    Stress: Not on file   Relationships    Social connections     Talks on phone: Not on file     Gets together: Not on file Attends Judaism service: Not on file     Active member of club or organization: Not on file     Attends meetings of clubs or organizations: Not on file     Relationship status: Not on file    Intimate partner violence     Fear of current or ex partner: Not on file     Emotionally abused: Not on file     Physically abused: Not on file     Forced sexual activity: Not on file   Other Topics Concern    Not on file   Social History Narrative    Not on file       FAMILY HISTORY:  Family History   Problem Relation Age of Onset    Heart Disease Maternal Grandfather     Heart Disease Paternal Grandfather     Thyroid Disease Mother     Thyroid Disease Maternal Aunt        REVIEW OF SYSTEMS: Complete ROS assessed and noted for that which is described above, all else are negative. Eyes: normal  ENT: normal  CVS: normal  Resp: normal  GI: normal  : normal  GYN: normal  Endocrine: normal  Integument: normal  Musculoskeletal: normal  Neuro: normal  Psych: normal    PHYSICAL EXAMINATION:  Telemedicine Visit    GENERAL: NCAT, Appears well nourished  EYES: EOMI, non-icteric, no proptosis  Ear/Nose/Throat: NCAT, no visible inflammation or masses  CARDIOVASCULAR: no cyanosis, no visible JVD  RESPIRATORY: comfortable respirations observed, no cyanosis  MUSCULOSKELETAL: Normal ROM of upper extremities observed  SKIN: No edema, rash, or other significant changes observed  NEUROLOGIC:  AAOx3  PSYCHIATRIC: Normal affect, Normal insight and judgement    REVIEW OF LABORATORY AND RADIOLOGY DATA:   Labs and documentation have been reviewed as described above. ASSESSMENT AND PLAN:   Cam Kirkpatrick is a 37 y.o. female with a PMHx as noted above who presents to the endocrinology clinic for f/u evaluation of hyperthyroidism.      Hyperthyroidism due to graves disease    As noted, we had not seen her for over a year but she had been feeling just fine last summer and so she had discontinued her methimazole tablets, noting however that by the end of the year she had very noticeable symptoms, and when evaluated a few weeks ago surely her thyroid levels appeared to be very high. I suspect she needs a much higher dose briefly with a taper and we will plan as follows, also with plan to re-evaluate her levels again after a number of weeks. Increase methimazole to 20mg twice per day for 2 weeks,   Then reduce to 30mg once per day,  Repeat lab week of May 10th, clinic appt not needed for this,  Will review and adjust further,    Ramiro HARVEY.  [address] [facility]

## 2021-05-20 ENCOUNTER — PATIENT MESSAGE (OUTPATIENT)
Dept: ENDOCRINOLOGY | Age: 36
End: 2021-05-20

## 2021-05-20 LAB
T3 SERPL-MCNC: 107 NG/DL (ref 71–180)
T4 FREE SERPL-MCNC: 0.7 NG/DL (ref 0.82–1.77)
TSH SERPL DL<=0.005 MIU/L-ACNC: 0.01 UIU/ML (ref 0.45–4.5)

## 2021-05-20 NOTE — PROGRESS NOTES
Thyroid labs reviewed, see [employer] message sent to patient    Yovani Call.  [address] [facility]  [address] [place] [address]

## 2021-05-24 ENCOUNTER — VIRTUAL VISIT (OUTPATIENT)
Dept: ENDOCRINOLOGY | Age: 36
End: 2021-05-24
Payer: COMMERCIAL

## 2021-05-24 DIAGNOSIS — E05.00 GRAVES DISEASE: Primary | ICD-10-CM

## 2021-05-24 PROCEDURE — 99214 OFFICE O/P EST MOD 30 MIN: CPT | Performed by: INTERNAL MEDICINE

## 2021-05-24 RX ORDER — METHIMAZOLE 10 MG/1
10 TABLET ORAL DAILY
Qty: 30 TABLET | Refills: 0
Start: 2021-05-24 | End: 2021-07-25 | Stop reason: SDUPTHER

## 2021-05-24 NOTE — PROGRESS NOTES
Lab Results   Component Value Date/Time    TSH 0.010 (L) 05/19/2021 11:40 AM    Triiodothyronine (T3), free 2.4 08/16/2019 02:28 PM    T4, Free 0.70 (L) 05/19/2021 11:40 AM

## 2021-05-24 NOTE — PROGRESS NOTES
Chief Complaint   Patient presents with    New Patient     Mychart    Thyroid Problem    Other     [facility]       History of Present Illness: Mickey Marina is a 37 y.o. female with a past medical hx significant for Grave's disease presenting in follow up for continued discussion regarding hypERthyroidism. Previously followed with Gladys Chakraborty:  + TSI / TPO / TgAb 's, suggestive of graves disease, initially noted in pregnancy. We had started her on methimazole with taper. Previously had a baby girl June 25th, Kathy,  She was last seen over 1 year ago  On that visit I had recommended the following:               10mg (2 tabs) once daily for 4 weeks, then,              1 and a half tablets (7.5mg) daily thereafter,  Last summer she did not continue the methimazole,  In Nov 2020 she started eating healthy and working out,  She lost a lot of weight however,   Her PCP obtained labs a few weeks ago,   She was started on methimazole 20 mg once daily, using 5mg tabs,  Her current weight is 137,   Obtaining outside labs:              TSH: 0.005, FT4 6.10  Symptoms: had palpitations and tremors which are improving however    05/24/2021: Methimazole was resumed in April of 2021- has been taking 30mg since that time. Had sx in November with weight loss/tachycardia. No interest in pursuing fertility at this time. Past Medical History:   Diagnosis Date    Graves disease     Hyperthyroidism        History reviewed. No pertinent surgical history. Current Outpatient Medications   Medication Sig    pseudoephedrine/CPM/methylscop (ALLERGY AM-PM PO) Take  by mouth.  multivitamin (ONE A DAY) tablet Take 1 Tab by mouth daily.     OTHER Black Current Oil    methIMAzole (TAPAZOLE) 10 mg tablet 2 tabs twice daily for 2 weeks, then 3 tabs once daily thereafter,  Indications: overactive thyroid gland (Patient taking differently: 3 tabs once daily  Indications: overactive thyroid gland)     No current facility-administered medications for this visit. Allergies   Allergen Reactions    Pcn [Penicillins] Hives       Family History   Problem Relation Age of Onset    Heart Disease Maternal Grandfather     Heart Disease Paternal Grandfather     Thyroid Disease Mother     Thyroid Disease Maternal Aunt     Thyroid Disease Father     No Known Problems Brother          Social Hx:89/2 year olds     Review of Systems:  - Constitutional Symptoms: got down to 130 with hyperthyroid phase  - Eyes: no blurry vision or double vision  - Cardiovascular: HR resting was 110 with hyperthyroid phase, now in 60s  - Respiratory: no cough or shortness of breath  - Gastrointestinal: no dysphagia or abdominal pain  - Musculoskeletal: no joint pains or weakness  - Integumentary: no rashes  - Neurological: no numbness, tingling, or headaches  - Psychiatric: no depression or anxiety  - Endocrine: no heat or cold intolerance, no polyuria or polydipsia    Physical Examination:  Wt Readings from Last 3 Encounters:   02/21/20 186 lb (84.4 kg)   09/20/19 182 lb (82.6 kg)   07/26/19 181 lb 14.4 oz (82.5 kg)      - GENERAL: NCAT, Appears well nourished   - EYES: EOMI, non-icteric, no proptosis   - Ear/Nose/Throat: NCAT, no visible inflammation or masses   - CARDIOVASCULAR: no cyanosis, no visible JVD   - RESPIRATORY: respiratory effort normal without any distress or labored breathing   - MUSCULOSKELETAL: Normal ROM of neck and upper extremities observed   - SKIN: No rash on face  - NEUROLOGIC:  No facial asymmetry (Cranial nerve 7 motor function), No gaze palsy   - PSYCHIATRIC: Normal affect, Normal insight and judgement     Data Reviewed:         Assessment/Plan: This is a very pleasant 30-year-old female with a past medical history significant for Graves' disease with positive thyroperoxidase antibodies presenting in follow-up for continued discussion regarding methimazole titration.   Following a period of discontinuation, developed significant weight loss, palpitations and was resumed on 30 mg. Currently is biochemically hypothyroid. Drop dose to 10 mg once daily and repeat labs in 2 weeks. Goal is to titrate her down to 5 mg or 2.5 mg.  Discussed the fact that her weight gain was most certainly due to the treatment of hyperthyroidism and that if she is biochemically euthyroid she should be able to maintain a healthy weight. #Grave's disease on MMI  -drop to 10mg of methimazole daily from 30 mg daily  -repeat labs in 2 weeks to see if we can drop to 5mg   -discussed risks of agranulocytosis and hepatoxicity   -of note does have TPO AB positivity-increased predilection towards hypothyroid phase of hyperthyroidism    You are taking methimazole or PTU for treatment of your hyperthyroidism. One out of 300 patients taking either of these drugs may develop agranulocytosis, lowering of the white blood cell count, which resolves once the drug is stopped. White blood cells help your body to fight infection. Patients with low white blood cell counts develop fevers and sore throats. During the entire period you are taking methimazole, if you develop a fever or sore throat you must stop the drug and go immediately to a lab to get a blood test for a white blood cell count. Do not resume taking the methimazole until you hear from me or another physician. Please present this slip to the lab so that they will know what blood test to do and will be able to fax the result to me. You must then call [facility] at [phone] and ask to speak with the physician on call. You MUST speak to the on call physician to tell him/her that you have had either a fever or a sore throat and the blood test has been drawn. In addition, if you are nauseated or lose your appetite for more than 2 days, you must stop the drug and call the Endocrinology clinic. To the lab:   This patient needs a CBC with differential  Please fax the result to               Luis A Hernandez MD [phone]                                                         Diagnosis: 242.00      Copy sent to: Jamin Reyes MD      Return to care:August 6th at 2:10      Marolyn Gilford is a 37 y.o. female being evaluated by a Virtual Visit (video visit) encounter to address concerns as mentioned above. A caregiver was present when appropriate. Due to this being a TeleHealth encounter (During [facility]-40 public health emergency), evaluation of the following organ systems was limited:     Vitals/Constitutional/EENT/Resp/CV/GI//MS/Neuro/Skin/Heme-Lymph-Imm. Pursuant to the emergency declaration under the [address] [place] [address] authority and the [employer] Act, this Virtual Visit was conducted with patient's (and/or legal guardian's) consent, to reduce the risk of exposure to COVID-19 and provide necessary medical care. Services were provided through a video synchronous discussion virtually to substitute for in-person encounter. Luis A Hernandez MD  [facility]     An electronic signature was used to authenticate this note.

## 2021-06-08 DIAGNOSIS — E05.00 GRAVES DISEASE: ICD-10-CM

## 2021-06-19 LAB
ALBUMIN SERPL-MCNC: 4.7 G/DL (ref 3.8–4.8)
ALBUMIN/GLOB SERPL: 2 {RATIO} (ref 1.2–2.2)
ALP SERPL-CCNC: 70 IU/L (ref 48–121)
ALT SERPL-CCNC: 27 IU/L (ref 0–32)
AST SERPL-CCNC: 24 IU/L (ref 0–40)
BILIRUB SERPL-MCNC: 0.6 MG/DL (ref 0–1.2)
BUN SERPL-MCNC: 13 MG/DL (ref 6–20)
BUN/CREAT SERPL: 17 (ref 9–23)
CALCIUM SERPL-MCNC: 9.2 MG/DL (ref 8.7–10.2)
CHLORIDE SERPL-SCNC: 102 MMOL/L (ref 96–106)
CO2 SERPL-SCNC: 24 MMOL/L (ref 20–29)
CREAT SERPL-MCNC: 0.77 MG/DL (ref 0.57–1)
GLOBULIN SER CALC-MCNC: 2.4 G/DL (ref 1.5–4.5)
GLUCOSE SERPL-MCNC: 115 MG/DL (ref 65–99)
POTASSIUM SERPL-SCNC: 4.3 MMOL/L (ref 3.5–5.2)
PROT SERPL-MCNC: 7.1 G/DL (ref 6–8.5)
SODIUM SERPL-SCNC: 139 MMOL/L (ref 134–144)
T3 SERPL-MCNC: 114 NG/DL (ref 71–180)
T4 FREE SERPL-MCNC: 1.06 NG/DL (ref 0.82–1.77)

## 2021-06-20 DIAGNOSIS — E05.00 GRAVES DISEASE: Primary | ICD-10-CM

## 2021-06-20 NOTE — LETTER
6/20/2021    Ms. Zhoul Antis  [place] [address]  [place] [address]      Dear Belinda Antis:    Please find your most recent results below. Resulted Orders   T4, FREE   Result Value Ref Range    T4, Free 1.06 0.82 - 1.77 ng/dL    Narrative    Performed at:  [address]  [id number]  : Aldo Huang MD, Phone:  [phone]   T3 TOTAL   Result Value Ref Range    T3, total 114 71 - 180 ng/dL    Narrative    Performed at:  [address]  [id number]  : Aldo Huang MD, Phone:  [phone]   METABOLIC PANEL, COMPREHENSIVE   Result Value Ref Range    Glucose 115 (H) 65 - 99 mg/dL    BUN 13 6 - 20 mg/dL    Creatinine 0.77 0.57 - 1.00 mg/dL    GFR est non- >59 mL/min/1.73    GFR est  >59 mL/min/1.73    BUN/Creatinine ratio 17 9 - 23    Sodium 139 134 - 144 mmol/L    Potassium 4.3 3.5 - 5.2 mmol/L    Chloride 102 96 - 106 mmol/L    CO2 24 20 - 29 mmol/L    Calcium 9.2 8.7 - 10.2 mg/dL    Protein, total 7.1 6.0 - 8.5 g/dL    Albumin 4.7 3.8 - 4.8 g/dL    GLOBULIN, TOTAL 2.4 1.5 - 4.5 g/dL    A-G Ratio 2.0 1.2 - 2.2    Bilirubin, total 0.6 0.0 - 1.2 mg/dL    Alk. phosphatase 70 48 - 121 IU/L    AST (SGOT) 24 0 - 40 IU/L    ALT (SGPT) 27 0 - 32 IU/L    Narrative    Performed at:  [address]  [id number]  : Aldo Huang MD, Phone:  [phone]       RECOMMENDATIONS:    Temi,  Go ahead and drop your methimazole dose to 5mg and we will repeat labs in 1 month.      Please call me if you have any questions: [phone]    Sincerely,      Abigail Blue MD

## 2021-07-01 DIAGNOSIS — E05.00 GRAVES DISEASE: ICD-10-CM

## 2021-07-01 DIAGNOSIS — R73.9 HIGH BLOOD SUGAR: Primary | ICD-10-CM

## 2021-07-01 NOTE — H&P
See my-chart message and or results release.      Maren Masters, [facility]

## 2021-07-25 DIAGNOSIS — E05.00 GRAVES DISEASE: Primary | ICD-10-CM

## 2021-07-25 RX ORDER — METHIMAZOLE 5 MG/1
7.5 TABLET ORAL DAILY
Qty: 120 TABLET | Refills: 2 | Status: SHIPPED | OUTPATIENT
Start: 2021-07-25 | End: 2022-01-25 | Stop reason: ALTCHOICE

## 2021-07-29 LAB
EST. AVERAGE GLUCOSE BLD GHB EST-MCNC: 97 MG/DL
GAD65 AB SER IA-ACNC: <5 U/ML (ref 0–5)
HBA1C MFR BLD: 5 % (ref 4.8–5.6)
T3 SERPL-MCNC: 181 NG/DL (ref 71–180)
T4 FREE SERPL-MCNC: 1.95 NG/DL (ref 0.82–1.77)

## 2021-08-06 ENCOUNTER — VIRTUAL VISIT (OUTPATIENT)
Dept: ENDOCRINOLOGY | Age: 36
End: 2021-08-06
Payer: COMMERCIAL

## 2021-08-06 DIAGNOSIS — E05.00 GRAVES DISEASE: Primary | ICD-10-CM

## 2021-08-06 PROCEDURE — 99214 OFFICE O/P EST MOD 30 MIN: CPT | Performed by: INTERNAL MEDICINE

## 2021-08-06 NOTE — PROGRESS NOTES
Chief Complaint   Patient presents with    Thyroid Problem     Mychart    Follow-up       History of Present Illness: Idalia Holman is a 37 y.o. female with a past medical hx significant for Grave's disease presenting in follow up for continued discussion regarding hypERthyroidism. Previously followed with Reubin Burkitt:  + TSI / TPO / TgAb 's, suggestive of graves disease, initially noted in pregnancy. We had started her on methimazole with taper. Previously had a baby girl June 25th, Kathy,  She was last seen over 1 year ago  On that visit I had recommended the following:               10mg (2 tabs) once daily for 4 weeks, then,              1 and a half tablets (7.5mg) daily thereafter,  Last summer she did not continue the methimazole,  In Nov 2020 she started eating healthy and working out,  She lost a lot of weight however,   Her PCP obtained labs a few weeks ago,   She was started on methimazole 20 mg once daily, using 5mg tabs,  Her current weight is 137,   Obtaining outside labs:              TSH: 0.005, FT4 6.10  Symptoms: had palpitations and tremors which are improving however    05/24/2021: Methimazole was resumed in April of 2021- has been taking 30mg since that time. Had sx in November with weight loss/tachycardia. No interest in pursuing fertility at this time. 08/06/2021:Free T4 increased reducing from 10mg to 5mg, asked her to take 7.5mg instead. Has been on 7.5mg for about 2 weeks, has labs pending mid Aug. Went to [employer] recently for work. HR has been 65-68, 72 highest in past month. When on the 5mg had started to lose weight. Hasn't gained it all back. History reviewed. No pertinent surgical history. Current Outpatient Medications   Medication Sig    methIMAzole (TAPAZOLE) 5 mg tablet Take 1.5 Tablets by mouth daily. Indications: overactive thyroid gland    pseudoephedrine/CPM/methylscop (ALLERGY AM-PM PO) Take  by mouth.     multivitamin (ONE A DAY) tablet Take 1 Tab by mouth daily.    OTHER [employer]     No current facility-administered medications for this visit. Social Hx:11-boy/9- girl/3girl year olds        Review of Systems:  - Constitutional Symptoms: got down to 130 with hyperthyroid phase  - Eyes: no blurry vision or double vision  - Cardiovascular: HR resting was 110 with hyperthyroid phase, now in 60s  - Respiratory: no cough or shortness of breath  - Gastrointestinal: no dysphagia or abdominal pain  - Musculoskeletal: no joint pains or weakness  - Integumentary: no rashes  - Neurological: no numbness, tingling, or headaches  - Psychiatric: no depression or anxiety  - Endocrine: no heat or cold intolerance, no polyuria or polydipsia    Physical Examination:  Wt Readings from Last 3 Encounters:   02/21/20 186 lb (84.4 kg)   09/20/19 182 lb (82.6 kg)   07/26/19 181 lb 14.4 oz (82.5 kg)      - GENERAL: NCAT, Appears well nourished   - EYES: EOMI, non-icteric, no proptosis   - Ear/Nose/Throat: NCAT, no visible inflammation or masses   - CARDIOVASCULAR: no cyanosis, no visible JVD   - RESPIRATORY: respiratory effort normal without any distress or labored breathing   - MUSCULOSKELETAL: Normal ROM of neck and upper extremities observed   - SKIN: No rash on face  - NEUROLOGIC:  No facial asymmetry (Cranial nerve 7 motor function), No gaze palsy   - PSYCHIATRIC: Normal affect, Normal insight and judgement     Data Reviewed:         Assessment/Plan: This is a very pleasant 51-year-old female with a past medical history significant for Graves' disease with positive thyroperoxidase antibodies presenting in follow-up for continued discussion regarding methimazole titration. Following a period of discontinuation, developed significant weight loss, palpitations and was resumed on 30 mg for 4 months. By May, was biochemically hypothyroid. We dropped dose to 10 mg once daily, repeat labs stable, 1 month later after dropping to 5mg, again high.   Dose is somewhere between 5mg and 10mg. Will reassess 2 wks after taking 7.5mg, likely 2 wks later, late Sept before I leave on Mat leave and then 3rd week in Jan when I return. #Grave's disease on MMI  -30mg MMI too high, 5mg too low- has been on 7.5mg for 2 wks, reassess labs on this dose  -discussed risks of agranulocytosis and hepatoxicity   -of note does have TPO AB positivity-increased predilection towards hypothyroid phase of hyperthyroidism    You are taking methimazole or PTU for treatment of your hyperthyroidism. One out of 300 patients taking either of these drugs may develop agranulocytosis, lowering of the white blood cell count, which resolves once the drug is stopped. White blood cells help your body to fight infection. Patients with low white blood cell counts develop fevers and sore throats. During the entire period you are taking methimazole, if you develop a fever or sore throat you must stop the drug and go immediately to a lab to get a blood test for a white blood cell count. Do not resume taking the methimazole until you hear from me or another physician. Please present this slip to the lab so that they will know what blood test to do and will be able to fax the result to me. You must then call [facility] at [phone] and ask to speak with the physician on call. You MUST speak to the on call physician to tell him/her that you have had either a fever or a sore throat and the blood test has been drawn. In addition, if you are nauseated or lose your appetite for more than 2 days, you must stop the drug and call the Endocrinology clinic. To the lab: This patient needs a CBC with differential  Please fax the result to               Zakia Blanco MD [phone]                                                         Diagnosis: 242.00      Copy sent to: Joe Ceja MD      Return to care:Feb 14th 1:50      Sonya Beatty is a 37 y.o. female being evaluated by a Virtual Visit (video visit) encounter to address concerns as mentioned above. A caregiver was present when appropriate. Due to this being a TeleHealth encounter (During [facility] public health emergency), evaluation of the following organ systems was limited:     Vitals/Constitutional/EENT/Resp/CV/GI//MS/Neuro/Skin/Heme-Lymph-Imm. Pursuant to the emergency declaration under the [address] [place] [address] and the [employer] Act, this Virtual Visit was conducted with patient's (and/or legal guardian's) consent, to reduce the risk of exposure to COVID-19 and provide necessary medical care. Services were provided through a video synchronous discussion virtually to substitute for in-person encounter. Brittnee Branch MD  [facility]     An electronic signature was used to authenticate this note.

## 2021-08-06 NOTE — PROGRESS NOTES
Lab Results   Component Value Date/Time    TSH 0.010 (L) 05/19/2021 11:40 AM    Triiodothyronine (T3), free 2.4 08/16/2019 02:28 PM    T4, Free 1.95 (H) 07/23/2021 10:02 AM

## 2021-08-18 DIAGNOSIS — E05.00 GRAVES DISEASE: Primary | ICD-10-CM

## 2021-08-18 LAB
T3 SERPL-MCNC: 121 NG/DL (ref 71–180)
T4 FREE SERPL-MCNC: 1.49 NG/DL (ref 0.82–1.77)

## 2021-08-18 NOTE — LETTER
8/18/2021    Ms. Soham Parkr [address]  [place] [address] [facility] [address]      Dear Soham Alfaro:    Please find your most recent results below. Resulted Orders   T4, FREE   Result Value Ref Range    T4, Free 1.49 0.82 - 1.77 ng/dL    Narrative    Performed at:  [address]  [id number]  : Jhon Carrington MD, Phone:  [phone]   T3 TOTAL   Result Value Ref Range    T3, total 121 71 - 180 ng/dL    Narrative    Performed at:  [address]  [id number]  : Jhon Carrington MD, Phone:  [phone]       RECOMMENDATIONS:    Medhat Membreno,     Your labs are stable with 7.5mg of methimazole daily. Continue this dose and repeat labs in 1 month.     Please call me if you have any questions: [phone]    Sincerely,      Gabino Crain MD

## 2021-12-16 LAB
T3 SERPL-MCNC: 144 NG/DL (ref 71–180)
T4 FREE SERPL-MCNC: 1.91 NG/DL (ref 0.82–1.77)

## 2022-01-25 DIAGNOSIS — E05.00 GRAVES DISEASE: Primary | ICD-10-CM

## 2022-01-25 RX ORDER — METHIMAZOLE 10 MG/1
10 TABLET ORAL DAILY
Qty: 90 TABLET | Refills: 1 | Status: SHIPPED | OUTPATIENT
Start: 2022-01-25 | End: 2022-08-07

## 2022-01-25 NOTE — H&P
MMI 7.5mg has proven to be insufficient, Free T4 up-trending. Return to 10mg.     [facility]

## 2022-01-25 NOTE — LETTER
1/25/2022    Ms. Vikas Carrizales  [address]  [address] [facility] [address]      Dear Vikas Carrizales    It looks like 7.5mg is just not enough methimazole for your thyroid, Temi. I have sent in a refill for 10mg tablets. You can use up all of the 5mg tablets you have currently if there are any remaining, just take 2. We will repeat labs a couple months from now, around late March.         Please call me if you have any questions: [phone]        Sincerely,      Joseph Downing MD

## 2022-02-14 ENCOUNTER — OFFICE VISIT (OUTPATIENT)
Dept: ENDOCRINOLOGY | Age: 37
End: 2022-02-14
Payer: COMMERCIAL

## 2022-02-14 VITALS
DIASTOLIC BLOOD PRESSURE: 56 MMHG | HEIGHT: 64 IN | RESPIRATION RATE: 16 BRPM | HEART RATE: 73 BPM | SYSTOLIC BLOOD PRESSURE: 121 MMHG | WEIGHT: 158.4 LBS | OXYGEN SATURATION: 99 % | BODY MASS INDEX: 27.04 KG/M2

## 2022-02-14 DIAGNOSIS — E05.00 GRAVES DISEASE: Primary | ICD-10-CM

## 2022-02-14 PROCEDURE — 99214 OFFICE O/P EST MOD 30 MIN: CPT | Performed by: INTERNAL MEDICINE

## 2022-02-14 NOTE — PROGRESS NOTES
Chief Complaint   Patient presents with    Thyroid Problem    Follow-up       History of Present Illness: Shahbaz Evans is a 31 y.o. female with a past medical hx significant for Grave's disease presenting in follow up for continued discussion regarding hypERthyroidism. Previously followed with Paco Braun:  + TSI / TPO / TgAb 's, suggestive of graves disease, initially noted in pregnancy. We had started her on methimazole with taper. Previously had a baby girl June 25th, Kathy,  She was last seen over 1 year ago  On that visit I had recommended the following:               10mg (2 tabs) once daily for 4 weeks, then,              1 and a half tablets (7.5mg) daily thereafter,  Last summer she did not continue the methimazole,  In Nov 2020 she started eating healthy and working out,  She lost a lot of weight however,   Her PCP obtained labs a few weeks ago,   She was started on methimazole 20 mg once daily, using 5mg tabs,  Her current weight is 137,   Obtaining outside labs:              TSH: 0.005, FT4 6.10  Symptoms: had palpitations and tremors which are improving however    05/24/2021: Methimazole was resumed in April of 2021- has been taking 30mg since that time. Had sx in November with weight loss/tachycardia. No interest in pursuing fertility at this time. 08/06/2021:Free T4 increased reducing from 10mg to 5mg, asked her to take 7.5mg instead. Has been on 7.5mg for about 2 weeks, has labs pending mid Aug. Went to [employer] recently for work. HR has been 65-68, 72 highest in past month. When on the 5mg had started to lose weight. Hasn't gained it all back. 02/14/2022: Wrote letter 01/15 recommending increase to 10mg- has been on this for 2 weeks. Was feeling good on 7.5mg, notes the fact that it is difficult for her and hyperthyroidism but that tremor is typically a predominant symptom. Is trying to be more consistent with taking methimazole. History reviewed.  No pertinent surgical history. Current Outpatient Medications   Medication Sig    methIMAzole (TAPAZOLE) 10 mg tablet Take 1 Tablet by mouth daily.  pseudoephedrine/CPM/methylscop (ALLERGY AM-PM PO) Take  by mouth. No current facility-administered medications for this visit. Social Hx:11-boy/9- girl/3girl year olds        Review of Systems:  - Constitutional Symptoms: got down to 130 with hyperthyroid phase  - Eyes: no blurry vision or double vision  - Cardiovascular: HR resting was 110-120s with hyperthyroid phase, now in 60s  - Respiratory: no cough or shortness of breath  - Gastrointestinal: no dysphagia or abdominal pain  - Musculoskeletal: no joint pains or weakness  - Integumentary: no rashes  - Neurological: no numbness, tingling, or headaches  - Psychiatric: no depression or anxiety  - Endocrine: no heat or cold intolerance, no polyuria or polydipsia    Physical Examination:  Wt Readings from Last 3 Encounters:   02/14/22 158 lb 6.4 oz (71.8 kg)   02/21/20 186 lb (84.4 kg)   09/20/19 182 lb (82.6 kg)      Visit Vitals  BP (!) 121/56   Pulse 73   Resp 16   Ht 5' 4\" (1.626 m)   Wt 158 lb 6.4 oz (71.8 kg)   SpO2 99%   BMI 27.19 kg/m²       - GENERAL: NCAT, Appears well nourished   - EYES: EOMI, non-icteric, no proptosis   - Ear/Nose/Throat: NCAT, no visible inflammation or masses   - CARDIOVASCULAR: no cyanosis, no visible JVD   - RESPIRATORY: respiratory effort normal without any distress or labored breathing   - MUSCULOSKELETAL: Normal ROM of neck and upper extremities observed   - SKIN: No rash on face  - NEUROLOGIC:   No tremor is present with outstretched hands  - PSYCHIATRIC: Normal affect, Normal insight and judgement     Data Reviewed:         Assessment/Plan: This is a very pleasant 31-year-old female with a past medical history significant for Graves' disease with positive thyroperoxidase antibodies presenting in follow-up for continued discussion regarding methimazole titration.   Following a period of discontinuation, developed significant weight loss, palpitations and was resumed on 30 mg for 4 months. By May, was biochemically hypothyroid. We dropped dose to 10 mg once daily, repeat labs stable, 1 month later after dropping to 5mg, again high. Dose is somewhere between 5mg and 10mg. Again became hyperthyroid with 7.5 g once daily. Is attempting to take methimazole inconsistently, travel makes this partner. Anticipate dose to be between 7.5 to 10 mg. #Grave's disease on MMI  -30mg MMI too high, 5mg too low-anticipate dose will be between 7.5 to 10 mg  -discussed risks of agranulocytosis and hepatoxicity   -of note does have TPO AB positivity-increased predilection towards hypothyroid phase of hyperthyroidism    You are taking methimazole or PTU for treatment of your hyperthyroidism. One out of 300 patients taking either of these drugs may develop agranulocytosis, lowering of the white blood cell count, which resolves once the drug is stopped. White blood cells help your body to fight infection. Patients with low white blood cell counts develop fevers and sore throats. During the entire period you are taking methimazole, if you develop a fever or sore throat you must stop the drug and go immediately to a lab to get a blood test for a white blood cell count. Do not resume taking the methimazole until you hear from me or another physician. Please present this slip to the lab so that they will know what blood test to do and will be able to fax the result to me. You must then call [facility] at [phone] and ask to speak with the physician on call. You MUST speak to the on call physician to tell him/her that you have had either a fever or a sore throat and the blood test has been drawn. In addition, if you are nauseated or lose your appetite for more than 2 days, you must stop the drug and call the Endocrinology clinic. To the lab:   This patient needs a CBC with differential  Please fax the result to               Suresh Reyes MD [phone]                                                         Diagnosis: 242.00      Copy sent to: Emilie Finnegan MD      Return to care: 4 months    Suresh Reyes MD  [facility]

## 2022-03-25 DIAGNOSIS — E05.00 GRAVES DISEASE: ICD-10-CM

## 2022-03-29 DIAGNOSIS — E05.00 GRAVES DISEASE: Primary | ICD-10-CM

## 2022-03-29 LAB
T3 SERPL-MCNC: 122 NG/DL (ref 71–180)
T4 FREE SERPL-MCNC: 1.23 NG/DL (ref 0.82–1.77)

## 2022-04-04 ENCOUNTER — PATIENT MESSAGE (OUTPATIENT)
Dept: ENDOCRINOLOGY | Age: 37
End: 2022-04-04

## 2022-06-08 LAB
T3 SERPL-MCNC: 109 NG/DL (ref 71–180)
T4 FREE SERPL-MCNC: 1.24 NG/DL (ref 0.82–1.77)
TSH SERPL DL<=0.005 MIU/L-ACNC: 0.02 UIU/ML (ref 0.45–4.5)

## 2022-06-13 DIAGNOSIS — E05.00 GRAVES DISEASE: ICD-10-CM

## 2022-06-20 ENCOUNTER — VIRTUAL VISIT (OUTPATIENT)
Dept: ENDOCRINOLOGY | Age: 37
End: 2022-06-20
Payer: COMMERCIAL

## 2022-06-20 DIAGNOSIS — E05.00 GRAVES DISEASE: Primary | ICD-10-CM

## 2022-06-20 PROCEDURE — 99214 OFFICE O/P EST MOD 30 MIN: CPT | Performed by: INTERNAL MEDICINE

## 2022-06-20 NOTE — PROGRESS NOTES
Chief Complaint   Patient presents with    Thyroid Problem    Follow-up    Medication Adjustment       History of Present Illness: Keila East is a 31 y.o. female with a past medical hx significant for Grave's disease presenting in follow up for continued discussion regarding hypERthyroidism. Previously followed with Lea Shaw:  + TSI / TPO / TgAb 's, suggestive of graves disease, initially noted in pregnancy. We had started her on methimazole with taper. Previously had a baby girl June 25th, Kathy,  She was last seen over 1 year ago  On that visit I had recommended the following:               10mg (2 tabs) once daily for 4 weeks, then,              1 and a half tablets (7.5mg) daily thereafter,  Last summer she did not continue the methimazole,  In Nov 2020 she started eating healthy and working out,  She lost a lot of weight however,   Her PCP obtained labs a few weeks ago,   She was started on methimazole 20 mg once daily, using 5mg tabs,  Her current weight is 137,   Obtaining outside labs:              TSH: 0.005, FT4 6.10  Symptoms: had palpitations and tremors which are improving however    05/24/2021: Methimazole was resumed in April of 2021- has been taking 30mg since that time. Had sx in November with weight loss/tachycardia. No interest in pursuing fertility at this time. 08/06/2021:Free T4 increased reducing from 10mg to 5mg, asked her to take 7.5mg instead. Has been on 7.5mg for about 2 weeks, has labs pending mid Aug. Went to [employer] recently for work. HR has been 65-68, 72 highest in past month. When on the 5mg had started to lose weight. Hasn't gained it all back. 02/14/2022: Wrote letter 01/15 recommending increase to 10mg- has been on this for 2 weeks. Was feeling good on 7.5mg, notes the fact that it is difficult for her and hyperthyroidism but that tremor is typically a predominant symptom. Is trying to be more consistent with taking methimazole.      06/20/2022: Got a pill box so she isn't missing doses- it is in a central place. Still a little tremor, checks with paper towel, improved- depends on when she does it. There are times she feels sluggish tired, weight stays consistent. Those types of things make her think it's too much. Hard to differentiate life vs sx. Thought for sure TSH would be high. Going to [place] with family, son and  went on a backpacking trip at [place]. History reviewed. No pertinent surgical history. Current Outpatient Medications   Medication Sig    methIMAzole (TAPAZOLE) 10 mg tablet Take 1 Tablet by mouth daily. No current facility-administered medications for this visit. Social Hx:11-boy/9- girl/3girl year olds        Review of Systems:  - See HPI    Physical Examination:  Wt Readings from Last 3 Encounters:   02/14/22 158 lb 6.4 oz (71.8 kg)   02/21/20 186 lb (84.4 kg)   09/20/19 182 lb (82.6 kg)      There were no vitals taken for this visit. - GENERAL: NCAT, Appears well nourished   - EYES: EOMI, non-icteric, no proptosis   - Ear/Nose/Throat: NCAT, no visible inflammation or masses   - CARDIOVASCULAR: no cyanosis, no visible JVD   - RESPIRATORY: respiratory effort normal without any distress or labored breathing   - MUSCULOSKELETAL: Normal ROM of neck and upper extremities observed   - SKIN: No rash on face  - NEUROLOGIC:   No tremor is present with outstretched hands  - PSYCHIATRIC: Normal affect, Normal insight and judgement     Data Reviewed:         Assessment/Plan: This is a very pleasant 21-year-old female with a past medical history significant for Graves' disease with positive thyroperoxidase antibodies presenting in follow-up for continued discussion regarding methimazole titration. Following a period of discontinuation, developed significant weight loss, palpitations and was resumed on 30 mg for 4 months. By May, was biochemically hypothyroid.   We dropped dose to 10 mg once daily, repeat labs stable, 1 month later after dropping to 5mg, again high. Dose is somewhere between 5mg and 10mg. Again became hyperthyroid with 7.5 g once daily. As of June 2020, TSH remains low on 10 mg once daily. Will avoid causing biochemical hypothyroidism by increasing to 15 mg once daily. If TSH remains low 3 months from now will alternate 15 and 10 mg. #Grave's disease on MMI  -30mg MMI too high, 5mg/7.5mg too low  -TSH remains low on 10 mg for 3 months but free T4 and total T3 normal  -Repeat labs 3 months from now to ensure it is uptrending, if not alternate 15 and 10 mg  -discussed risks of agranulocytosis and hepatoxicity   -of note does have TPO AB positivity-increased predilection towards hypothyroid phase of hyperthyroidism    You are taking methimazole or PTU for treatment of your hyperthyroidism. One out of 300 patients taking either of these drugs may develop agranulocytosis, lowering of the white blood cell count, which resolves once the drug is stopped. White blood cells help your body to fight infection. Patients with low white blood cell counts develop fevers and sore throats. During the entire period you are taking methimazole, if you develop a fever or sore throat you must stop the drug and go immediately to a lab to get a blood test for a white blood cell count. Do not resume taking the methimazole until you hear from me or another physician. Please present this slip to the lab so that they will know what blood test to do and will be able to fax the result to me. You must then call [facility] at [phone] and ask to speak with the physician on call. You MUST speak to the on call physician to tell him/her that you have had either a fever or a sore throat and the blood test has been drawn. In addition, if you are nauseated or lose your appetite for more than 2 days, you must stop the drug and call the Endocrinology clinic. To the lab:   This patient needs a CBC with differential  Please fax the result to               Roshni Isaac MD [phone]                                                         Diagnosis: 242.00      Copy sent to: Maria L Shirley MD      Return to care: 4 months    Roshni Isaac MD  [facility]

## 2022-08-07 RX ORDER — METHIMAZOLE 10 MG/1
TABLET ORAL
Qty: 90 TABLET | Refills: 1 | Status: SHIPPED | OUTPATIENT
Start: 2022-08-07

## 2022-08-22 ENCOUNTER — ANESTHESIA EVENT (OUTPATIENT)
Dept: MEDSURG UNIT | Age: 37
End: 2022-08-22
Payer: SELF-PAY

## 2022-08-22 PROBLEM — Z41.1 ENCOUNTER FOR COSMETIC SURGERY: Status: ACTIVE | Noted: 2022-08-22

## 2022-08-22 NOTE — H&P
Temi Abdalla  : 1985  DOS: 2022    Chief Complaint: consultation       Allergies: No Non-Medication Allergies (NNMA) , penicillin       Medications: methIMAzole 10 mg oral tablet       Medical History: Height: 5' 5\", Weight: 155 lbs    Pulmonary System: Negative  Cardiac System: Negative  Blood and Liver Systems: Negative  Neurologic and Endocrine Systems: hypothyroid  GI,  and Reproductive Systems: Negative  Cancer: Negative   Surgical History: D and C, lost pregnancy ,  broken wrist       Family History: Depression Mother , Depression Brother       Social History: Smoking Status  4 Never smoker    Pregnancy   3 Children          Ms. Kristin Murphy is a pleasant 46-year-old female who presents today with her , for a cosmetic consultation regarding her desire to undergo elective body contouring surgery. She is specifically interested in addressing changes that have occurred to her breasts and her abdomen following the birth of 3 children ages 15, 8, and 1 all naturally. She did breast-feed and denies a history of breast cancer in her family. She currently wears a size 36 A bra. She also states that despite her efforts at diet and exercise she is unable to obtain the look she desires regarding soft tissue changes to her abdominal wall. She also feels as though she has laxity and fullness in her submental space and is interested in a more youthful tone and appearance in this area as well. She denies previous elective surgery. Review of systems reveals normal heart and lung sounds. Examination of her chin/neck reveals a subtle degree of soft tissue laxity and supra platysma fat. She has age-appropriate skin tone. She does not demonstrate any marked asymmetries or facial nerve dysfunction. Examination of her breasts reveals grade 1 ptosis with an additional 3 or 4 cm of pseudoptosis. The right breast may be slightly more ptotic than the left.   Otherwise her anatomy appears to be normally developed and equal.  The sternal notch to nipple distance is approximately 24.5 cm on the right, 24 cm on the left, and the ideal nipple position is around 20 cm. The nipple to fold distance is about 7.5 cm on the right, 7 cm on the left, and her base time is 15 cm. Examination of her abdominal wall reveals a type II-III abdomen with skin tone issues and laxity and stretch marks below the umbilicus. Her muscle tone is also poor consistent with her age and history. She does not have a fold or pannus, and she has a normal amount of lipodystrophy across the abdomen and the posterior flank or love handle region. I had a lengthy discussion with Temi regarding abdominoplasty. Temi understands this is performed under a general anesthetic on an outpatient basis. I diagrammed on paper and patients anterior abdomen where the low transverse incision and the umbilical incision is made. Temi understands the elevation of the skin and fat layer off the muscle fascial layer, permanent plication or tightening of the rectus fascial layer with Prolene sutures, flexing at the waist and excising the excess skin and fat, closure of the wounds in layers with dissolvable sutures, the use of a suction drain for up to 2 weeks, and surgical garments with activity restrictions while healing which can be extended beyond 6 weeks. There is no exercise for 4 weeks. The risks and complications include but are not limited to infection, pain, bleeding, hematoma's requiring re-operation, skin sensitivity changes, vascular compromise to tissues resulting in partial or complete loss of tissues, resultant open wounds, the need for long term wound care and dressing changes and scarring, irregularities and asymmetries requiring touch-up and revision surgery, an unacceptable cosmetic result, and other things including cardiac and pulmonary risks that include death.     I had a lengthy discussion with Temi, regarding breast augmentation mammoplasty. Temi understands this is performed under a general anesthetic on an outpatient basis. I diagrammed on paper and patients right breast where the incisions are made and we discussed the differences between saline and silicone implants, smooth versus textured, round versus anatomic, submuscular versus subglandular, and the various incisions. In most cases an implant is placed in a submuscular plane and the incisions are closed in layers with dissolvable sutures. A compression Ace wrap is used for 2 days then converted to a loose surgical bra or no bra at all for 4 weeks. There is no strenuous exercise for 4 weeks. The risks and complications include but are not limited to infection, pain, bleeding, hematoma's requiring re-operation, skin sensitivity changes, vascular compromise to tissues resulting in partial or complete loss of tissues, resultant open wounds, the need for long term wound care and dressing changes and scarring, irregularities and asymmetries requiring touch-up and revision surgery, an unacceptable cosmetic result, and other things including cardiac and pulmonary risks that include death. Implant specific problems include capsular contracture, scalloping or rippling, implant malposition, implant failure, and implant infection. In addition to augmentation, I believe she will require a skin tightening procedure or mastopexy to provide her with the most ideal cosmetic result. She understands once the implant of her selection is placed usually in a submuscular plane through an incision made on the breast, additional incisions are made around the nipple areolar complex and the lower pole of the breast to remove redundant skin. Our goal is to ensure the nipple areolar complex is on the most anterior projection of the breast on the meridian line and all tissue sits above the fold.   Any incisions are closed in layers with dissolvable sutures and a compression garment is used with associated activity restrictions. She is a candidate for a mini neck lift which is also performed under a general anesthetic through an incision in the submental crease and access incisions below each ear lobe. Liposuction is performed from both sides and through the midline specially to expose the platysma muscle and the natural decussation. The platysma muscle is plicated with Mersilene sutures from the mentum to the hyoid bone. There are no drain tubes placed, the skin is not excised or removed, and the incisions closed with sutures and a soft padded compression garment is used with associated activity restrictions while she is healing which can extend beyond 6 weeks. The risks and complications include but are not limited to infection, pain, bleeding, hematomas requiring re-operation, skin sensitivity changes, vascular compromise to tissues resulting in partial or complete loss of tissues, resultant open wounds, the need for long term wound care and dressing changes and scarring, irregularities and asymmetries requiring touch-up and revision surgery, an unacceptable cosmetic result, and other things including cardiac and pulmonary risks that include death. She was sized today by Suzanna Stallings and selected a 400-cc smooth round cohesive silicone gel breast implant in a submuscular plane combined with at least a doughnut mastopexy. Her questions were answered, and pictures were taken. They will meet with Nica to discuss the fees for an abdominoplasty, breast augmentation and are not mastopexy, and mini neck lift. The patient was counseled about the risks of yari Covid-19 during their perioperative period and any recovery window from their procedure. The patient was made aware that yari Covid-19 may worsen their prognosis for recovering from their procedure and lend to a higher morbidity and/or mortality risk.   All material risks, benefits, and reasonable alternatives including postponing the procedure were discussed. The patient DOES wish to proceed with their procedure at this time. Freya Holden M.D.  FACS

## 2022-08-23 ENCOUNTER — ANESTHESIA (OUTPATIENT)
Dept: MEDSURG UNIT | Age: 37
End: 2022-08-23
Payer: SELF-PAY

## 2022-08-23 ENCOUNTER — HOSPITAL ENCOUNTER (OUTPATIENT)
Age: 37
Setting detail: OUTPATIENT SURGERY
Discharge: HOME | End: 2022-08-23
Attending: SURGERY | Admitting: SURGERY
Payer: SELF-PAY

## 2022-08-23 VITALS
DIASTOLIC BLOOD PRESSURE: 82 MMHG | OXYGEN SATURATION: 96 % | WEIGHT: 162 LBS | HEIGHT: 64 IN | RESPIRATION RATE: 11 BRPM | HEART RATE: 82 BPM | TEMPERATURE: 98.4 F | SYSTOLIC BLOOD PRESSURE: 130 MMHG | BODY MASS INDEX: 27.66 KG/M2

## 2022-08-23 LAB — HCG UR QL: NEGATIVE

## 2022-08-23 PROCEDURE — 77030008462 HC STPLR SKN PROX J&J -A: Performed by: SURGERY

## 2022-08-23 PROCEDURE — 77030026227 HC FUNL KELLR 2 PCH ALGN -C: Performed by: SURGERY

## 2022-08-23 PROCEDURE — 74011250636 HC RX REV CODE- 250/636: Performed by: NURSE ANESTHETIST, CERTIFIED REGISTERED

## 2022-08-23 PROCEDURE — 74011000250 HC RX REV CODE- 250: Performed by: NURSE ANESTHETIST, CERTIFIED REGISTERED

## 2022-08-23 PROCEDURE — 77030040922 HC BLNKT HYPOTHRM STRY -A

## 2022-08-23 PROCEDURE — 77030038692 HC WND DEB SYS IRMX -B: Performed by: SURGERY

## 2022-08-23 PROCEDURE — 74011250636 HC RX REV CODE- 250/636: Performed by: SURGERY

## 2022-08-23 PROCEDURE — 77030002986 HC SUT PROL J&J -A: Performed by: SURGERY

## 2022-08-23 PROCEDURE — 2709999900 HC NON-CHARGEABLE SUPPLY

## 2022-08-23 PROCEDURE — 77030011264 HC ELECTRD BLD EXT COVD -A: Performed by: SURGERY

## 2022-08-23 PROCEDURE — 77030026438 HC STYL ET INTUB CARD -A: Performed by: ANESTHESIOLOGY

## 2022-08-23 PROCEDURE — 76030000004 HC AMB SURG OR TIME 2 TO 2.5: Performed by: SURGERY

## 2022-08-23 PROCEDURE — 81025 URINE PREGNANCY TEST: CPT

## 2022-08-23 PROCEDURE — 76060000064 HC AMB SURG ANES 2 TO 2.5 HR: Performed by: SURGERY

## 2022-08-23 PROCEDURE — 77030040361 HC SLV COMPR DVT MDII -B: Performed by: SURGERY

## 2022-08-23 PROCEDURE — 77030002933 HC SUT MCRYL J&J -A: Performed by: SURGERY

## 2022-08-23 PROCEDURE — 74011000250 HC RX REV CODE- 250: Performed by: SURGERY

## 2022-08-23 PROCEDURE — 77030008684 HC TU ET CUF COVD -B: Performed by: ANESTHESIOLOGY

## 2022-08-23 PROCEDURE — 77030041680 HC PNCL ELECSURG SMK EVAC CNMD -B: Performed by: SURGERY

## 2022-08-23 PROCEDURE — 77030031139 HC SUT VCRL2 J&J -A: Performed by: SURGERY

## 2022-08-23 PROCEDURE — 76210000035 HC AMBSU PH I REC 1 TO 1.5 HR: Performed by: SURGERY

## 2022-08-23 PROCEDURE — 74011250637 HC RX REV CODE- 250/637: Performed by: ANESTHESIOLOGY

## 2022-08-23 PROCEDURE — 2709999900 HC NON-CHARGEABLE SUPPLY: Performed by: SURGERY

## 2022-08-23 DEVICE — SMOOTH ROUND MODERATE PLUS PROFILE GEL-FILLED BREAST IMPLANT, 400CC  SMOOTH ROUND SILICONE
Type: IMPLANTABLE DEVICE | Site: BREAST | Status: FUNCTIONAL
Brand: MENTOR MEMORYGEL BREAST IMPLANT

## 2022-08-23 RX ORDER — SODIUM CHLORIDE, SODIUM LACTATE, POTASSIUM CHLORIDE, CALCIUM CHLORIDE 600; 310; 30; 20 MG/100ML; MG/100ML; MG/100ML; MG/100ML
50 INJECTION, SOLUTION INTRAVENOUS CONTINUOUS
Status: DISCONTINUED | OUTPATIENT
Start: 2022-08-23 | End: 2022-08-23 | Stop reason: HOSPADM

## 2022-08-23 RX ORDER — LIDOCAINE HYDROCHLORIDE 20 MG/ML
INJECTION, SOLUTION EPIDURAL; INFILTRATION; INTRACAUDAL; PERINEURAL AS NEEDED
Status: DISCONTINUED | OUTPATIENT
Start: 2022-08-23 | End: 2022-08-23 | Stop reason: HOSPADM

## 2022-08-23 RX ORDER — HYDROMORPHONE HYDROCHLORIDE 1 MG/ML
0.2 INJECTION, SOLUTION INTRAMUSCULAR; INTRAVENOUS; SUBCUTANEOUS
Status: CANCELLED | OUTPATIENT
Start: 2022-08-23

## 2022-08-23 RX ORDER — SODIUM CHLORIDE 0.9 % (FLUSH) 0.9 %
5-40 SYRINGE (ML) INJECTION EVERY 8 HOURS
Status: CANCELLED | OUTPATIENT
Start: 2022-08-23

## 2022-08-23 RX ORDER — FENTANYL CITRATE 50 UG/ML
INJECTION, SOLUTION INTRAMUSCULAR; INTRAVENOUS AS NEEDED
Status: DISCONTINUED | OUTPATIENT
Start: 2022-08-23 | End: 2022-08-23 | Stop reason: HOSPADM

## 2022-08-23 RX ORDER — LIDOCAINE HYDROCHLORIDE 10 MG/ML
0.1 INJECTION, SOLUTION EPIDURAL; INFILTRATION; INTRACAUDAL; PERINEURAL AS NEEDED
Status: DISCONTINUED | OUTPATIENT
Start: 2022-08-23 | End: 2022-08-23 | Stop reason: HOSPADM

## 2022-08-23 RX ORDER — HYDROMORPHONE HYDROCHLORIDE 2 MG/ML
INJECTION, SOLUTION INTRAMUSCULAR; INTRAVENOUS; SUBCUTANEOUS AS NEEDED
Status: DISCONTINUED | OUTPATIENT
Start: 2022-08-23 | End: 2022-08-23 | Stop reason: HOSPADM

## 2022-08-23 RX ORDER — ONDANSETRON 2 MG/ML
4 INJECTION INTRAMUSCULAR; INTRAVENOUS AS NEEDED
Status: CANCELLED | OUTPATIENT
Start: 2022-08-23

## 2022-08-23 RX ORDER — OXYCODONE HYDROCHLORIDE 5 MG/1
5 TABLET ORAL
Status: CANCELLED | OUTPATIENT
Start: 2022-08-23

## 2022-08-23 RX ORDER — PROCHLORPERAZINE EDISYLATE 5 MG/ML
5 INJECTION INTRAMUSCULAR; INTRAVENOUS
Status: CANCELLED | OUTPATIENT
Start: 2022-08-23

## 2022-08-23 RX ORDER — SODIUM CHLORIDE 0.9 % (FLUSH) 0.9 %
5-40 SYRINGE (ML) INJECTION AS NEEDED
Status: CANCELLED | OUTPATIENT
Start: 2022-08-23

## 2022-08-23 RX ORDER — PROPOFOL 10 MG/ML
INJECTION, EMULSION INTRAVENOUS AS NEEDED
Status: DISCONTINUED | OUTPATIENT
Start: 2022-08-23 | End: 2022-08-23 | Stop reason: HOSPADM

## 2022-08-23 RX ORDER — GLYCOPYRROLATE 0.2 MG/ML
INJECTION INTRAMUSCULAR; INTRAVENOUS AS NEEDED
Status: DISCONTINUED | OUTPATIENT
Start: 2022-08-23 | End: 2022-08-23 | Stop reason: HOSPADM

## 2022-08-23 RX ORDER — MORPHINE SULFATE 2 MG/ML
2 INJECTION, SOLUTION INTRAMUSCULAR; INTRAVENOUS
Status: CANCELLED | OUTPATIENT
Start: 2022-08-23

## 2022-08-23 RX ORDER — CEFAZOLIN SODIUM 1 G/3ML
INJECTION, POWDER, FOR SOLUTION INTRAMUSCULAR; INTRAVENOUS AS NEEDED
Status: DISCONTINUED | OUTPATIENT
Start: 2022-08-23 | End: 2022-08-23 | Stop reason: HOSPADM

## 2022-08-23 RX ORDER — PHENYLEPHRINE HCL IN 0.9% NACL 0.4MG/10ML
SYRINGE (ML) INTRAVENOUS AS NEEDED
Status: DISCONTINUED | OUTPATIENT
Start: 2022-08-23 | End: 2022-08-23 | Stop reason: HOSPADM

## 2022-08-23 RX ORDER — NEOSTIGMINE METHYLSULFATE 1 MG/ML
INJECTION, SOLUTION INTRAVENOUS AS NEEDED
Status: DISCONTINUED | OUTPATIENT
Start: 2022-08-23 | End: 2022-08-23 | Stop reason: HOSPADM

## 2022-08-23 RX ORDER — SODIUM CHLORIDE 0.9 % (FLUSH) 0.9 %
5-40 SYRINGE (ML) INJECTION AS NEEDED
Status: DISCONTINUED | OUTPATIENT
Start: 2022-08-23 | End: 2022-08-23 | Stop reason: HOSPADM

## 2022-08-23 RX ORDER — SODIUM CHLORIDE 0.9 % (FLUSH) 0.9 %
5-40 SYRINGE (ML) INJECTION EVERY 8 HOURS
Status: DISCONTINUED | OUTPATIENT
Start: 2022-08-23 | End: 2022-08-23 | Stop reason: HOSPADM

## 2022-08-23 RX ORDER — ONDANSETRON 2 MG/ML
INJECTION INTRAMUSCULAR; INTRAVENOUS AS NEEDED
Status: DISCONTINUED | OUTPATIENT
Start: 2022-08-23 | End: 2022-08-23 | Stop reason: HOSPADM

## 2022-08-23 RX ORDER — FENTANYL CITRATE 50 UG/ML
25 INJECTION, SOLUTION INTRAMUSCULAR; INTRAVENOUS
Status: CANCELLED | OUTPATIENT
Start: 2022-08-23

## 2022-08-23 RX ORDER — DEXAMETHASONE SODIUM PHOSPHATE 4 MG/ML
INJECTION, SOLUTION INTRA-ARTICULAR; INTRALESIONAL; INTRAMUSCULAR; INTRAVENOUS; SOFT TISSUE AS NEEDED
Status: DISCONTINUED | OUTPATIENT
Start: 2022-08-23 | End: 2022-08-23 | Stop reason: HOSPADM

## 2022-08-23 RX ORDER — MIDAZOLAM HYDROCHLORIDE 1 MG/ML
0.5 INJECTION, SOLUTION INTRAMUSCULAR; INTRAVENOUS
Status: CANCELLED | OUTPATIENT
Start: 2022-08-23

## 2022-08-23 RX ORDER — ACETAMINOPHEN 500 MG
1000 TABLET ORAL ONCE
Status: COMPLETED | OUTPATIENT
Start: 2022-08-23 | End: 2022-08-23

## 2022-08-23 RX ORDER — SODIUM CHLORIDE, SODIUM LACTATE, POTASSIUM CHLORIDE, CALCIUM CHLORIDE 600; 310; 30; 20 MG/100ML; MG/100ML; MG/100ML; MG/100ML
INJECTION, SOLUTION INTRAVENOUS
Status: DISCONTINUED | OUTPATIENT
Start: 2022-08-23 | End: 2022-08-23 | Stop reason: HOSPADM

## 2022-08-23 RX ORDER — CLINDAMYCIN PHOSPHATE 600 MG/50ML
600 INJECTION INTRAVENOUS ONCE
Status: DISCONTINUED | OUTPATIENT
Start: 2022-08-23 | End: 2022-08-23 | Stop reason: HOSPADM

## 2022-08-23 RX ORDER — SCOLOPAMINE TRANSDERMAL SYSTEM 1 MG/1
1 PATCH, EXTENDED RELEASE TRANSDERMAL ONCE
Status: DISCONTINUED | OUTPATIENT
Start: 2022-08-23 | End: 2022-08-23 | Stop reason: HOSPADM

## 2022-08-23 RX ORDER — ROCURONIUM BROMIDE 10 MG/ML
INJECTION, SOLUTION INTRAVENOUS AS NEEDED
Status: DISCONTINUED | OUTPATIENT
Start: 2022-08-23 | End: 2022-08-23 | Stop reason: HOSPADM

## 2022-08-23 RX ORDER — SUCCINYLCHOLINE CHLORIDE 20 MG/ML
INJECTION INTRAMUSCULAR; INTRAVENOUS AS NEEDED
Status: DISCONTINUED | OUTPATIENT
Start: 2022-08-23 | End: 2022-08-23 | Stop reason: HOSPADM

## 2022-08-23 RX ORDER — MIDAZOLAM HYDROCHLORIDE 1 MG/ML
INJECTION, SOLUTION INTRAMUSCULAR; INTRAVENOUS AS NEEDED
Status: DISCONTINUED | OUTPATIENT
Start: 2022-08-23 | End: 2022-08-23 | Stop reason: HOSPADM

## 2022-08-23 RX ORDER — DEXMEDETOMIDINE HYDROCHLORIDE 100 UG/ML
INJECTION, SOLUTION INTRAVENOUS AS NEEDED
Status: DISCONTINUED | OUTPATIENT
Start: 2022-08-23 | End: 2022-08-23 | Stop reason: HOSPADM

## 2022-08-23 RX ADMIN — DEXMEDETOMIDINE HYDROCHLORIDE 10 MCG: 100 INJECTION, SOLUTION, CONCENTRATE INTRAVENOUS at 12:43

## 2022-08-23 RX ADMIN — Medication 2 MG: at 14:02

## 2022-08-23 RX ADMIN — ROCURONIUM BROMIDE 20 MG: 10 SOLUTION INTRAVENOUS at 12:40

## 2022-08-23 RX ADMIN — LIDOCAINE HYDROCHLORIDE 100 MG: 20 INJECTION, SOLUTION EPIDURAL; INFILTRATION; INTRACAUDAL; PERINEURAL at 11:54

## 2022-08-23 RX ADMIN — SUCCINYLCHOLINE CHLORIDE 120 MG: 20 INJECTION, SOLUTION INTRAMUSCULAR; INTRAVENOUS at 11:54

## 2022-08-23 RX ADMIN — ONDANSETRON HYDROCHLORIDE 4 MG: 2 INJECTION, SOLUTION INTRAMUSCULAR; INTRAVENOUS at 13:35

## 2022-08-23 RX ADMIN — DEXAMETHASONE SODIUM PHOSPHATE 8 MG: 4 INJECTION, SOLUTION INTRAMUSCULAR; INTRAVENOUS at 11:54

## 2022-08-23 RX ADMIN — Medication 200 MCG: at 13:22

## 2022-08-23 RX ADMIN — GLYCOPYRROLATE 0.4 MG: 0.2 INJECTION INTRAMUSCULAR; INTRAVENOUS at 14:02

## 2022-08-23 RX ADMIN — ROCURONIUM BROMIDE 45 MG: 10 SOLUTION INTRAVENOUS at 12:00

## 2022-08-23 RX ADMIN — Medication 200 MCG: at 13:04

## 2022-08-23 RX ADMIN — SODIUM CHLORIDE, POTASSIUM CHLORIDE, SODIUM LACTATE AND CALCIUM CHLORIDE: 600; 310; 30; 20 INJECTION, SOLUTION INTRAVENOUS at 11:44

## 2022-08-23 RX ADMIN — ACETAMINOPHEN 1000 MG: 500 TABLET ORAL at 10:44

## 2022-08-23 RX ADMIN — DEXMEDETOMIDINE HYDROCHLORIDE 10 MCG: 100 INJECTION, SOLUTION, CONCENTRATE INTRAVENOUS at 12:26

## 2022-08-23 RX ADMIN — FENTANYL CITRATE 50 MCG: 50 INJECTION, SOLUTION INTRAMUSCULAR; INTRAVENOUS at 12:16

## 2022-08-23 RX ADMIN — MIDAZOLAM 2 MG: 1 INJECTION INTRAMUSCULAR; INTRAVENOUS at 11:44

## 2022-08-23 RX ADMIN — HYDROMORPHONE HYDROCHLORIDE 1 MG: 2 INJECTION, SOLUTION INTRAMUSCULAR; INTRAVENOUS; SUBCUTANEOUS at 14:04

## 2022-08-23 RX ADMIN — HYDROMORPHONE HYDROCHLORIDE 1 MG: 2 INJECTION, SOLUTION INTRAMUSCULAR; INTRAVENOUS; SUBCUTANEOUS at 13:35

## 2022-08-23 RX ADMIN — CEFAZOLIN 2 G: 330 INJECTION, POWDER, FOR SOLUTION INTRAMUSCULAR; INTRAVENOUS at 11:58

## 2022-08-23 RX ADMIN — PROPOFOL 150 MG: 10 INJECTION, EMULSION INTRAVENOUS at 11:54

## 2022-08-23 RX ADMIN — FENTANYL CITRATE 50 MCG: 50 INJECTION, SOLUTION INTRAMUSCULAR; INTRAVENOUS at 11:54

## 2022-08-23 RX ADMIN — ROCURONIUM BROMIDE 5 MG: 10 SOLUTION INTRAVENOUS at 11:54

## 2022-08-23 NOTE — ANESTHESIA PREPROCEDURE EVALUATION
Relevant Problems   No relevant active problems       Anesthetic History   No history of anesthetic complications            Review of Systems / Medical History  Patient summary reviewed, nursing notes reviewed and pertinent labs reviewed    Pulmonary  Within defined limits                 Neuro/Psych   Within defined limits           Cardiovascular                  Exercise tolerance: >4 METS     GI/Hepatic/Renal                Endo/Other      Hypothyroidism       Other Findings              Physical Exam    Airway  Mallampati: I  TM Distance: > 6 cm  Neck ROM: normal range of motion   Mouth opening: Normal     Cardiovascular    Rhythm: regular           Dental  No notable dental hx       Pulmonary  Breath sounds clear to auscultation               Abdominal         Other Findings            Anesthetic Plan    ASA: 2  Anesthesia type: general          Induction: Intravenous  Anesthetic plan and risks discussed with: Patient

## 2022-08-23 NOTE — BRIEF OP NOTE
Brief Postoperative Note    Patient: Austin Vegas  YOB: 1985  MRN: [id number]    Date of Procedure: 8/23/2022     Pre-Op Diagnosis: COSMETIC    Post-Op Diagnosis: Same as preoperative diagnosis. Procedure(s):  BILATERAL BREAST AUGMENTATION WITH MASTOPEXY    Surgeon(s):  Annelise Woods MD    Surgical Assistant: Surg Asst-1: Marcos HUFFMAN    Anesthesia: General     Estimated Blood Loss (mL): less than 50     Complications: None    Specimens: * No specimens in log *     Implants:   Implant Name Type Inv. Item Serial No.  Lot No. LRB No. Used Action   IMPL BRST RND MP+ GEL 400ML --  - [id number] Breast IMPLANT BRST 400CC DIA31. 1CM P4CM NISH GEL SMOOTH RND MOD + [id number] [place] [id number]  1 Implanted   IMPL BRST RND MP+ GEL 400ML --  - [id number] Breast IMPLANT BRST 400CC DIA31. 1CM P4CM NISH GEL SMOOTH RND MOD + [id number] [place] [id number]  1 Implanted       Drains: * No LDAs found *    Findings: normal for age and history    Electronically Signed by Petra Salgado MD on 8/23/2022 at 2:14 PM

## 2022-08-23 NOTE — INTERVAL H&P NOTE
Update History & Physical    The Patient's History and Physical of August 16, 2022 was reviewed with the patient and I examined the patient. There was no change. The surgical site was confirmed by the patient and me. Plan:  The risk, benefits, expected outcome, and alternative to the recommended procedure have been discussed with the patient. Patient understands and wants to proceed with the procedure.     Electronically signed by Kelsey Little MD on 8/23/2022 at 11:33 AM

## 2022-08-23 NOTE — DISCHARGE INSTRUCTIONS
Leave the surgical garment and dressings ON and DRY for 48 hours, then  may remove for shower. Resume any important pre op medications and diet but use sport drinks like Gatorade or Power aid instead of water for 2-3 days and extra protein in diet helps wounds heal.  Keep head elevated at night when sleeping about 30 degrees, do not lay flat for about 2 weeks  Walk every 1-2 hours during the day in house while awake for 5-10 minutes during the first 2 weeks  NO strenuous activity or exercise for 4 weeks  Use stool softeners to prevent constipation, and use Probiotics to help GI tract function  Do not take pain medications on an empty stomach and use Gas X, Tums, and Rolaids to help with symptoms of indigestion  When you get home start with the nausea and the muscle relaxing medications first, start to eat and drink the Gatorade, and hold off on the narcotic pain medication as long as you can tolerate. When you shower, remove the surgical garment, remove AND SAVE THE WHITE IMPLANT STRAP that is safety pinned to the back/bottom of the bra, discard any lose gauze, shower like you normally would (no baths), place antibiotic ointment of choice over the nipple incisions with clean gauze, and place the second garment on like you found the first  one. Be sure to safety pin the white implant strap to the back/bottom of the bra like you found it, and use with the bra at all times until your first follow up appointment. The purpose of the strap is to place gentle downward pressure on the implants to keep them where we placed them. Call DR. Claribel Sandhu at [phone] (cell) for questions  Anticipate a phone call from Dr. Claribel Sandhu tonight          Anesthesia Discharge Guidelines      After general anesthesia or intravenous sedation, for 24 hours or while taking prescription Narcotics:  Limit your activities  Do not drive and operate hazardous machinery  Do not make important personal or business decisions  Do  not drink alcoholic beverages  If you have not urinated within 8 hours after discharge, please contact your surgeon on call. Report the following to your surgeon:  Excessive pain, swelling, redness or odor of or around the surgical area  Temperature over 100.5  Nausea and vomiting lasting longer than 4 hours or if unable to take medications  Any signs of decreased circulation or nerve impairment to extremity: change in color, persistent  numbness, tingling, coldness or increase pain  Any questions    ___________________________________________________________________________________________________________________________________       You have a scopolamine patch behind your left ear. This is to help prevent nausea. This patch can be worn for up to 3 days. The most common side effects are dry mouth/dry eyes. If you are not experiencing nausea and are experiencing side effects you may remove the patch sooner. Please remove the patch no later than Friday. Be sure to dispose of patch where children or pets cannot access it, wash your hands thoroughly, and do not touch your eyes.

## 2022-08-24 NOTE — OP NOTES
[address]  OPERATIVE REPORT    Name:  Elsa Vazquez  MR#:  [id number]  :  1985  ACCOUNT #:  [de-identified]  DATE OF SERVICE:  2022      PREOPERATIVE DIAGNOSES:  Mammary hypoplasia, postpartum involution, desires breast augmentation mammoplasty. POSTOPERATIVE DIAGNOSES:  Mammary hypoplasia, postpartum involution, desires breast augmentation mammoplasty. PROCEDURES PERFORMED:  Bilateral submuscular cohesive silicone gel breast augmentation mammoplasty, bilateral periareolar donut mastopexy. SURGEON:  Ilya Mccray MD    ASSISTANTS:  Alice Tesfaye, Silverio, and Cyril. STAFF:  OR staff, room #4, 7th floor, [facility] [address]:  General.    COMPLICATIONS:  None. SPECIMENS REMOVED:  None (superficial skin, minor, negligible, not weighed, discarded). IMPLANTS:  400-mL smooth round cohesive gel device bilaterally, previously described. ESTIMATED BLOOD LOSS:  Approximately 25 mL. IV FLUIDS:  1000 mL. DRAINS:  None. FINDINGS:  Normal for age and history. INDICATIONS FOR PROCEDURE:  The patient is a pleasant 62-year-old female who was seen in the [address] [place] [address] with a desire to undergo breast augmentation. She is an otherwise healthy young lady, 5 feet 5 inches tall, 155 pounds. She has 3 children, ages 15, 8, and 1, all delivered naturally with breast-feeding. She currently wears a size 36-A bra but has seen and appreciated involutional changes and loss of upper pole fullness secondary to breast-feeding. She preoperatively selected a 400 mL smooth round cohesive silicone gel breast implant in submuscular plane. Based on her degree of soft tissue laxity and involution, it is felt that she would require at least a periareolar mastopexy that would provide her with the most ideal result, and she comes in today for these combined procedures. PROCEDURE:  After appropriate consent was obtained, preop markings were placed.   She was taken to the operating room, placed on the operative table in supine position. Satisfactory general endotracheal anesthesia was obtained. SCD devices were placed per routine. Our local anesthesia solution was injected around each breast based on our preoperative markings and her chest was sterilely prepped and draped. We performed identical procedure bilaterally beginning on the patient's right side reducing the nipple-areolar complex to about 45-50 mm based on her existing areolar size with a cookie cutter. We made an inferior periareolar incision with a #10 scalpel blade. We then dissected with electrocautery to the breast tissues to the pectoralis major muscle. We then dissected in a caudal direction to find the most lateral caudal edge of the pectoralis major muscle and a submuscular or subpectoral pocket was then dissected in standard fashion under direct lighted visualization with electrocautery. The inferomedial fibers of the pectoralis major muscle to the sternum attachments were released with electrocautery as were the lateral chest wall tissues based on our preoperative markings. Once each pocket was dissected, they were inspected for symmetry and hemostasis. Both pockets were then irrigated with Irrisept irrigation solution and sterile saline. At this point, a [place] reference number [id number] smooth round moderate plus profile 400-mL cohesive gel implant was placed in each pocket. On the right side, it was lot number [id number]. On the left side, it was lot number [id number]. The patient was sat upright to inspect shape, size, and symmetry. Some adjustments were made at the 6 o'clock position on the left breast and it was apparent she would require at least a periareolar mastopexy. A concentric Campo was marked based on our preoperative markings to identify the 12 o'clock position.   She was then placed back in supine position and all breast tissues were closed in multiple layers with 3-0 Vicryl suture. The upper crescent skin excision was then performed with a #10 scalpel blade and there was no undermining in any of the breast tissue. A 3-0 Prolene suture on an SH needle was then used to tack the 12 o'clock position in a buried interrupted suture and then a pinwheel pursestring suture was placed also with a 3-0 Prolene suture on an SH needle to do the periareolar donut mastopexy. The superficial skin was then reapproximated with 4-0 Monocryl in a running subcuticular fashion. Xeroform gauze, 4 x 4 gauze, ABD pads, and a surgical bra with wide implant strap were placed. At the end of the bilateral procedures, sponge and needle counts were reported as correct. She was awakened, extubated, and transferred to the recovery room in satisfactory and stable condition. She will be discharged home today in care of her family with prescriptions and instructions and will follow up with me within 2 weeks.         Anshu Green MD      [id number]  D:  08/23/2022 14:28  T:  08/23/2022 22:15  JOB #:  [id number]  CC:  Margot Hobbs MD

## 2022-08-25 NOTE — ANESTHESIA POSTPROCEDURE EVALUATION
Post-Anesthesia Evaluation and Assessment    Patient: Junie James MRN: [id number]  SSN: [id number]    YOB: 1985  Age: 30 y.o. Sex: female      I have evaluated the patient and they are stable and ready for discharge from the PACU. Cardiovascular Function/Vital Signs  Visit Vitals  /82   Pulse 82   Temp 36.9 °C (98.4 °F)   Resp 11   Ht 5' 4\" (1.626 m)   Wt 73.5 kg (162 lb)   SpO2 96%   BMI 27.81 kg/m²       Patient is status post General anesthesia for Procedure(s):  BILATERAL BREAST AUGMENTATION WITH MASTOPEXY. Nausea/Vomiting: None    Postoperative hydration reviewed and adequate. Pain:  Pain Scale 1: Numeric (0 - 10) (08/23/22 1530)  Pain Intensity 1: 0 (08/23/22 1530)   Managed    Neurological Status:   Neuro (WDL): Within Defined Limits (08/23/22 1530)  Neuro  Neurologic State: Alert (08/23/22 1530)  LUE Motor Response: Spontaneous  (08/23/22 1530)  LLE Motor Response: Spontaneous  (08/23/22 1530)  RUE Motor Response: Spontaneous  (08/23/22 1530)  RLE Motor Response: Spontaneous  (08/23/22 1530)   At baseline    Mental Status, Level of Consciousness: Alert and  oriented to person, place, and time    Pulmonary Status:   O2 Device: None (Room air) (08/23/22 1516)   Adequate oxygenation and airway patent    Complications related to anesthesia: None    Post-anesthesia assessment completed. No concerns    Signed By: Stephy Manzo MD     August 25, 2022              Procedure(s):  BILATERAL BREAST AUGMENTATION WITH MASTOPEXY. general    <BSHSIANPOST>    INITIAL Post-op Vital signs:   Vitals Value Taken Time   /82 08/23/22 1531   Temp     Pulse 97 08/23/22 1539   Resp 15 08/23/22 1539   SpO2 93 % 08/23/22 1539   Vitals shown include unvalidated device data.

## 2022-09-07 LAB
ALBUMIN SERPL-MCNC: 4.6 G/DL (ref 3.8–4.8)
ALBUMIN/GLOB SERPL: 2.1 {RATIO} (ref 1.2–2.2)
ALP SERPL-CCNC: 48 IU/L (ref 44–121)
ALT SERPL-CCNC: 15 IU/L (ref 0–32)
AST SERPL-CCNC: 16 IU/L (ref 0–40)
BASOPHILS # BLD AUTO: 0.1 X10E3/UL (ref 0–0.2)
BASOPHILS NFR BLD AUTO: 1 %
BILIRUB SERPL-MCNC: 0.5 MG/DL (ref 0–1.2)
BUN SERPL-MCNC: 13 MG/DL (ref 6–20)
BUN/CREAT SERPL: 15 (ref 9–23)
CALCIUM SERPL-MCNC: 9.2 MG/DL (ref 8.7–10.2)
CHLORIDE SERPL-SCNC: 101 MMOL/L (ref 96–106)
CO2 SERPL-SCNC: 23 MMOL/L (ref 20–29)
CREAT SERPL-MCNC: 0.84 MG/DL (ref 0.57–1)
EGFR: 93 ML/MIN/1.73
EOSINOPHIL # BLD AUTO: 0 X10E3/UL (ref 0–0.4)
EOSINOPHIL NFR BLD AUTO: 0 %
ERYTHROCYTE [DISTWIDTH] IN BLOOD BY AUTOMATED COUNT: 11.6 % (ref 11.7–15.4)
GLOBULIN SER CALC-MCNC: 2.2 G/DL (ref 1.5–4.5)
GLUCOSE SERPL-MCNC: 95 MG/DL (ref 65–99)
HCT VFR BLD AUTO: 39.1 % (ref 34–46.6)
HGB BLD-MCNC: 13.1 G/DL (ref 11.1–15.9)
IMM GRANULOCYTES # BLD AUTO: 0 X10E3/UL (ref 0–0.1)
IMM GRANULOCYTES NFR BLD AUTO: 0 %
LYMPHOCYTES # BLD AUTO: 2.4 X10E3/UL (ref 0.7–3.1)
LYMPHOCYTES NFR BLD AUTO: 47 %
MCH RBC QN AUTO: 29.4 PG (ref 26.6–33)
MCHC RBC AUTO-ENTMCNC: 33.5 G/DL (ref 31.5–35.7)
MCV RBC AUTO: 88 FL (ref 79–97)
MONOCYTES # BLD AUTO: 0.3 X10E3/UL (ref 0.1–0.9)
MONOCYTES NFR BLD AUTO: 6 %
NEUTROPHILS # BLD AUTO: 2.3 X10E3/UL (ref 1.4–7)
NEUTROPHILS NFR BLD AUTO: 46 %
PLATELET # BLD AUTO: 293 X10E3/UL (ref 150–450)
POTASSIUM SERPL-SCNC: 4.4 MMOL/L (ref 3.5–5.2)
PROT SERPL-MCNC: 6.8 G/DL (ref 6–8.5)
RBC # BLD AUTO: 4.45 X10E6/UL (ref 3.77–5.28)
SODIUM SERPL-SCNC: 137 MMOL/L (ref 134–144)
T3 SERPL-MCNC: 97 NG/DL (ref 71–180)
T4 FREE SERPL-MCNC: 1.3 NG/DL (ref 0.82–1.77)
TSH SERPL DL<=0.005 MIU/L-ACNC: 1.43 UIU/ML (ref 0.45–4.5)
TSI SER-ACNC: 7.05 IU/L (ref 0–0.55)
WBC # BLD AUTO: 5.1 X10E3/UL (ref 3.4–10.8)

## 2022-11-04 NOTE — LETTER
11/4/2022    Ms. Miguelina Patino  [address]  [facility] [address]      Dear Miguelina Patino:    Please find your most recent results below. Resulted Orders   THYROID STIMULATING IMMUNOGLOBULIN   Result Value Ref Range    Thyroid Stim Immunoglobulin 5.02 (H) 0.00 - 0.55 IU/L    Narrative    Performed at:  [address]  [id number]  : Kendall Wong MD, Phone:  [phone]   TSH 3RD GENERATION   Result Value Ref Range    TSH 2.190 0.450 - 4.500 uIU/mL    Narrative    Performed at:  [address]  [id number]  : Kendall Wong MD, Phone:  [phone]   T4, FREE   Result Value Ref Range    T4, Free 1.09 0.82 - 1.77 ng/dL    Narrative    Performed at:  [address]  [id number]  : Kendall Wong MD, Phone:  [phone]   T3 TOTAL   Result Value Ref Range    T3, total 90 71 - 180 ng/dL    Narrative    Performed at:  [address]  [id number]  : Kendall Wong MD, Phone:  [phone]       RECOMMENDATIONS:  Labs still look great, continue 10mg- no need to repeat labs before your January visit- not in remission yet.      Please call me if you have any questions: [phone]    Sincerely,      Gregg Cabrera MD

## 2022-11-21 ENCOUNTER — PATIENT MESSAGE (OUTPATIENT)
Dept: ENDOCRINOLOGY | Age: 37
End: 2022-11-21

## 2022-11-23 NOTE — TELEPHONE ENCOUNTER
From: Shahbaz Evans  To: Jevon Lott MD  Sent: 11/21/2022 9:31 AM EST  Subject: Compatible meds with ME    Hi Dr Coco Bocanegra  My hair is still falling out in clumps every time I get a shower and I am unable to lose weight. Can you let me know what medication I can take to speed up my metabolism and improve whatever affects your complexion and hair loss that wont react with the Methimazole? I just cant do it anymore.  Thanks for the help

## 2022-12-01 RX ORDER — PHENTERMINE HYDROCHLORIDE 15 MG/1
15 CAPSULE ORAL
Qty: 30 CAPSULE | Refills: 0 | Status: SHIPPED | OUTPATIENT
Start: 2022-12-01

## 2022-12-28 RX ORDER — PHENTERMINE HYDROCHLORIDE 15 MG/1
15 CAPSULE ORAL
Qty: 30 CAPSULE | Refills: 0 | Status: SHIPPED | OUTPATIENT
Start: 2022-12-28

## 2023-01-13 ENCOUNTER — OFFICE VISIT (OUTPATIENT)
Dept: ENDOCRINOLOGY | Age: 38
End: 2023-01-13
Payer: COMMERCIAL

## 2023-01-13 VITALS
HEART RATE: 75 BPM | OXYGEN SATURATION: 100 % | HEIGHT: 64 IN | RESPIRATION RATE: 16 BRPM | WEIGHT: 154.8 LBS | SYSTOLIC BLOOD PRESSURE: 108 MMHG | BODY MASS INDEX: 26.43 KG/M2 | TEMPERATURE: 99.1 F | DIASTOLIC BLOOD PRESSURE: 72 MMHG

## 2023-01-13 DIAGNOSIS — E05.00 GRAVES DISEASE: Primary | ICD-10-CM

## 2023-01-13 RX ORDER — GLUCOSAMINE/CHONDR SU A SOD 750-600 MG
1 TABLET ORAL DAILY
COMMUNITY

## 2023-01-13 NOTE — PROGRESS NOTES
Chief Complaint   Patient presents with    Follow-up    Thyroid Problem     Patient is here today for a 4 month F/U for Hypothyroidism. History of Present Illness: Gricelda Rene is a 30 y.o. female with a past medical hx significant for Grave's disease presenting in follow up for continued discussion regarding hypERthyroidism. Previously followed with Albina Mansfield:  + TSI / TPO / TgAb 's, suggestive of graves disease, initially noted in pregnancy. We had started her on methimazole with taper. Previously had a baby girl June 25th, Kathy,  She was last seen over 1 year ago  On that visit I had recommended the following:               10mg (2 tabs) once daily for 4 weeks, then,              1 and a half tablets (7.5mg) daily thereafter,  Last summer she did not continue the methimazole,  In Nov 2020 she started eating healthy and working out,  She lost a lot of weight however,   Her PCP obtained labs a few weeks ago,   She was started on methimazole 20 mg once daily, using 5mg tabs,  Her current weight is 137,   Obtaining outside labs:              TSH: 0.005, FT4 6.10  Symptoms: had palpitations and tremors which are improving however    05/24/2021: Methimazole was resumed in April of 2021- has been taking 30mg since that time. Had sx in November with weight loss/tachycardia. No interest in pursuing fertility at this time. 08/06/2021:Free T4 increased reducing from 10mg to 5mg, asked her to take 7.5mg instead. Has been on 7.5mg for about 2 weeks, has labs pending mid Aug. Went to [employer] recently for work. HR has been 65-68, 72 highest in past month. When on the 5mg had started to lose weight. Hasn't gained it all back. 02/14/2022: Wrote letter 01/15 recommending increase to 10mg- has been on this for 2 weeks. Was feeling good on 7.5mg, notes the fact that it is difficult for her and hyperthyroidism but that tremor is typically a predominant symptom.  Is trying to be more consistent with taking methimazole. 06/20/2022: Got a pill box so she isn't missing doses- it is in a central place. Still a little tremor, checks with paper towel, improved- depends on when she does it. There are times she feels sluggish tired, weight stays consistent. Those types of things make her think it's too much. Hard to differentiate life vs sx. Thought for sure TSH would be high. Going to [place] with family, son and  went on a backpacking trip [facility].    01/13/2023: Feels like herself again on phentermine- not having anxiety on it. Methimazole is 10mg daily, no other concerns. Taking biotin for hair- rapid hair loss has slowed down. Handfuls are not coming out anymore.  had a vascectomy. Kids are 12/11/3- starting potty training in daughter. Cravings for alcohol and chocolate have reduced significantly with phentermine, has lost 10 pounds. Current Outpatient Medications   Medication Sig    Biotin 2,500 mcg cap Take 1 Tablet by mouth daily. phentermine (ADIPEX_P) 15 mg capsule Take 1 Capsule by mouth every morning. Max Daily Amount: 15 mg.    methIMAzole (TAPAZOLE) 10 mg tablet TAKE 1 TABLET BY MOUTH EVERY DAY     No current facility-administered medications for this visit.      Social Hx:11-boy/9- girl/3girl year olds        Review of Systems:  See HPI    Physical Examination:  Wt Readings from Last 3 Encounters:   01/13/23 154 lb 12.8 oz (70.2 kg)   08/23/22 162 lb (73.5 kg)   02/14/22 158 lb 6.4 oz (71.8 kg)      Visit Vitals  /72 (BP 1 Location: Left upper arm, BP Patient Position: Sitting, BP Cuff Size: Large adult)   Pulse 75   Temp 99.1 °F (37.3 °C) (Oral)   Resp 16   Ht 5' 4\" (1.626 m)   Wt 154 lb 12.8 oz (70.2 kg)   SpO2 100%   BMI 26.57 kg/m²       - GENERAL: NCAT, Appears well nourished   - EYES: EOMI, non-icteric, no proptosis   - Ear/Nose/Throat: NCAT, no visible inflammation or masses   - CARDIOVASCULAR: no cyanosis, no visible JVD   - RESPIRATORY: respiratory effort normal without any distress or labored breathing   - MUSCULOSKELETAL: Normal ROM of neck and upper extremities observed   - SKIN: No rash on face  - NEUROLOGIC:   No tremor is present with outstretched hands  - PSYCHIATRIC: Normal affect, Normal insight and judgement     Data Reviewed:         Assessment/Plan: This is a very pleasant 44-year-old female with a past medical history significant for Graves' disease with positive thyroperoxidase antibodies presenting in follow-up for continued discussion regarding methimazole titration. Following a period of discontinuation, developed significant weight loss, palpitations and was resumed on 30 mg for 4 months. By May, was biochemically hypothyroid. We dropped dose to 10 mg once daily, repeat labs stable, 1 month later after dropping to 5mg, again high. Dose is somewhere between 5mg and 10mg. Again became hyperthyroid with 7.5 g once daily. As of June 2020, TSH remains low on 10 mg once daily. Will avoid causing biochemical hypothyroidism by increasing to 15 mg once daily. Has been stable on 10 mg once daily as of January 2023. With initiation of phentermine has lost about 10 pounds and feeling much better overall. Reassess thyroid function tests now. #Grave's disease on MMI  -30mg MMI too high, 5mg/7.5mg too low  -As of January 2023, thyroid function test stable on 10 mg of methimazole daily  -Repeat labs now to ensure it stable, if not alternate 15 and 10 mg  -discussed risks of agranulocytosis and hepatoxicity   -of note does have TPO AB positivity-increased predilection towards hypothyroid phase of hyperthyroidism    #BMI 26  -Down 10 pounds and feeling much better on phentermine, continue    You are taking methimazole or PTU for treatment of your hyperthyroidism. One out of 300 patients taking either of these drugs may develop agranulocytosis, lowering of the white blood cell count, which resolves once the drug is stopped.   White blood cells help your body to fight infection. Patients with low white blood cell counts develop fevers and sore throats. During the entire period you are taking methimazole, if you develop a fever or sore throat you must stop the drug and go immediately to a lab to get a blood test for a white blood cell count. Do not resume taking the methimazole until you hear from me or another physician. Please present this slip to the lab so that they will know what blood test to do and will be able to fax the result to me. You must then call [facility] at [phone] and ask to speak with the physician on call. You MUST speak to the on call physician to tell him/her that you have had either a fever or a sore throat and the blood test has been drawn. In addition, if you are nauseated or lose your appetite for more than 2 days, you must stop the drug and call the Endocrinology clinic. To the lab: This patient needs a CBC with differential  Please fax the result to               Renita Fernando MD [phone]                                                         Diagnosis: 242.00      Copy sent to: Jose Chaney MD      Return to care: 4 months    Renita Fernando MD  [facility]

## 2023-01-13 NOTE — PROGRESS NOTES
Chief Complaint   Patient presents with    Follow-up    Thyroid Problem     Patient is here today for a 4 month F/U for Hypothyroidism. Vitals:    01/13/23 0909   BP: 108/72   Pulse: 75   Resp: 16   Temp: 99.1 °F (37.3 °C)   TempSrc: Oral   SpO2: 100%   Weight: 154 lb 12.8 oz (70.2 kg)   Height: 5' 4\" (1.626 m)   PainSc:   0 - No pain       Current Outpatient Medications on File Prior to Visit   Medication Sig Dispense Refill    Biotin 2,500 mcg cap Take 1 Tablet by mouth daily. phentermine (ADIPEX_P) 15 mg capsule Take 1 Capsule by mouth every morning. Max Daily Amount: 15 mg. 30 Capsule 0    methIMAzole (TAPAZOLE) 10 mg tablet TAKE 1 TABLET BY MOUTH EVERY DAY 90 Tablet 1     No current facility-administered medications on file prior to visit. Allergies   Allergen Reactions    Pcn [Penicillins] Hives           1. Have you been to the ER, urgent care clinic since your last visit? Hospitalized since your last visit? No    2. Have you seen or consulted any other health care providers outside of the [address] [place] [address] since your last visit? Include any pap smears or colon screening.  No

## 2023-01-14 DIAGNOSIS — E05.00 GRAVES DISEASE: Primary | ICD-10-CM

## 2023-01-14 LAB
ALBUMIN SERPL-MCNC: 4.9 G/DL (ref 3.8–4.8)
ALP SERPL-CCNC: 42 IU/L (ref 44–121)
ALT SERPL-CCNC: 18 IU/L (ref 0–32)
AST SERPL-CCNC: 21 IU/L (ref 0–40)
BILIRUB DIRECT SERPL-MCNC: 0.14 MG/DL (ref 0–0.4)
BILIRUB SERPL-MCNC: 0.7 MG/DL (ref 0–1.2)
PROT SERPL-MCNC: 7.5 G/DL (ref 6–8.5)
T3 SERPL-MCNC: 94 NG/DL (ref 71–180)
T4 FREE SERPL-MCNC: 1.32 NG/DL (ref 0.82–1.77)
TSH SERPL DL<=0.005 MIU/L-ACNC: 4.81 UIU/ML (ref 0.45–4.5)

## 2023-01-14 RX ORDER — METHIMAZOLE 10 MG/1
5 TABLET ORAL DAILY
Qty: 90 TABLET | Refills: 1 | Status: SHIPPED | OUTPATIENT
Start: 2023-01-14

## 2023-01-14 NOTE — LETTER
1/14/2023    Ms. Lachelle Parkr [address]  [place] [address]      Dear Lachelle Recinos:    Please find your most recent results below. Resulted Orders   TSH 3RD GENERATION   Result Value Ref Range    TSH 4.810 (H) 0.450 - 4.500 uIU/mL    Narrative    Performed at:  [facility] [place] [address]  [id number]  : Sandra Collins MD, Phone:  [phone]   T4, FREE   Result Value Ref Range    T4, Free 1.32 0.82 - 1.77 ng/dL    Narrative    Performed at:  [address] [place] [address]  [id number]  : Sandra Collins MD, Phone:  [phone]   T3 TOTAL   Result Value Ref Range    T3, total 94 71 - 180 ng/dL    Narrative    Performed at:  [address] [place] [address]  [id number]  : Sandra Collins MD, Phone:  [phone]   HEPATIC FUNCTION PANEL   Result Value Ref Range    Protein, total 7.5 6.0 - 8.5 g/dL    Albumin 4.9 (H) 3.8 - 4.8 g/dL    Bilirubin, total 0.7 0.0 - 1.2 mg/dL    Bilirubin, direct 0.14 0.00 - 0.40 mg/dL    Alk. phosphatase 42 (L) 44 - 121 IU/L    AST (SGOT) 21 0 - 40 IU/L    ALT (SGPT) 18 0 - 32 IU/L    Narrative    Performed at:  [address] [place] [address]  [id number]  : Sandra Collins MD, Phone:  [phone]       RECOMMENDATIONS:    Labs are showing a little bit of over-treatment. Let's drop to 5mg and repeat in 3 months.    Please call me if you have any questions: [phone]    Sincerely,      Rayne Drew MD

## 2023-01-30 RX ORDER — METHIMAZOLE 5 MG/1
5 TABLET ORAL DAILY
Qty: 90 TABLET | Refills: 1 | Status: SHIPPED | OUTPATIENT
Start: 2023-01-30

## 2023-01-30 RX ORDER — METHIMAZOLE 10 MG/1
5 TABLET ORAL DAILY
Qty: 90 TABLET | Refills: 1 | Status: SHIPPED | OUTPATIENT
Start: 2023-01-30 | End: 2023-01-30 | Stop reason: ALTCHOICE

## 2023-05-27 LAB — LDL CHOLESTEROL, EXTERNAL: 88

## 2023-07-06 DIAGNOSIS — E05.00 THYROTOXICOSIS WITH DIFFUSE GOITER WITHOUT THYROTOXIC CRISIS OR STORM: ICD-10-CM

## 2023-07-13 ENCOUNTER — OFFICE VISIT (OUTPATIENT)
Age: 38
End: 2023-07-13
Payer: COMMERCIAL

## 2023-07-13 VITALS
RESPIRATION RATE: 16 BRPM | DIASTOLIC BLOOD PRESSURE: 83 MMHG | OXYGEN SATURATION: 99 % | SYSTOLIC BLOOD PRESSURE: 121 MMHG | HEIGHT: 64 IN | HEART RATE: 99 BPM | WEIGHT: 162.2 LBS | BODY MASS INDEX: 27.69 KG/M2

## 2023-07-13 DIAGNOSIS — E05.00 THYROTOXICOSIS WITH DIFFUSE GOITER WITHOUT THYROTOXIC CRISIS OR STORM: Primary | ICD-10-CM

## 2023-07-13 LAB
T3 SERPL-MCNC: 90 NG/DL (ref 71–180)
T4 FREE SERPL-MCNC: 1.22 NG/DL (ref 0.82–1.77)
TSH SERPL DL<=0.005 MIU/L-ACNC: 2.81 UIU/ML (ref 0.45–4.5)

## 2023-07-13 PROCEDURE — 99214 OFFICE O/P EST MOD 30 MIN: CPT | Performed by: INTERNAL MEDICINE

## 2023-07-13 RX ORDER — PHENTERMINE HYDROCHLORIDE 15 MG/1
15 CAPSULE ORAL EVERY MORNING
Qty: 30 CAPSULE | Refills: 0 | Status: SHIPPED | OUTPATIENT
Start: 2023-07-13 | End: 2023-08-12

## 2023-07-13 RX ORDER — METHIMAZOLE 5 MG/1
5 TABLET ORAL DAILY
Qty: 90 TABLET | Refills: 1 | Status: SHIPPED | OUTPATIENT
Start: 2023-07-13

## 2023-08-29 ENCOUNTER — HOSPITAL ENCOUNTER (OUTPATIENT)
Facility: HOSPITAL | Age: 38
Discharge: HOME | End: 2023-09-01
Attending: INTERNAL MEDICINE
Payer: COMMERCIAL

## 2023-08-29 DIAGNOSIS — E05.00 THYROTOXICOSIS WITH DIFFUSE GOITER WITHOUT THYROTOXIC CRISIS OR STORM: ICD-10-CM

## 2023-08-29 PROCEDURE — 76536 US EXAM OF HEAD AND NECK: CPT

## 2023-10-10 RX ORDER — PHENTERMINE HYDROCHLORIDE 15 MG/1
15 CAPSULE ORAL EVERY MORNING
Qty: 90 CAPSULE | Refills: 1 | Status: SHIPPED | OUTPATIENT
Start: 2023-10-10 | End: 2024-04-07

## 2024-01-02 ENCOUNTER — TELEMEDICINE (OUTPATIENT)
Age: 39
End: 2024-01-02
Payer: COMMERCIAL

## 2024-01-02 DIAGNOSIS — E05.00 THYROTOXICOSIS WITH DIFFUSE GOITER WITHOUT THYROTOXIC CRISIS OR STORM: Primary | ICD-10-CM

## 2024-01-02 PROCEDURE — 99214 OFFICE O/P EST MOD 30 MIN: CPT | Performed by: INTERNAL MEDICINE

## 2024-01-02 NOTE — PROGRESS NOTES
Chief Complaint   Patient presents with    Thyroid Problem           History of Present Illness: Мария Louie is a  38 y.o.. female with a past medical hx significant for Grave's disease presenting in follow  up for continued discussion regarding hypERthyroidism.       Previously followed with Manuel:   + TSI / TPO / TgAb 's, suggestive of graves disease, initially noted in pregnancy.    We had started her on methimazole with taper.   Previously had a baby girl June 25th, Celia,   She was last seen over 1 year ago   On that visit I had recommended the following:                10mg (2 tabs) once daily for 4 weeks, then,               1 and a half tablets (7.5mg) daily thereafter,   Last summer she did not continue the methimazole,   In Nov 2020 she started eating healthy and working out,   She lost a lot of weight however,    Her PCP obtained labs a few weeks ago,    She was started on methimazole 20 mg once daily, using 5mg tabs,   Her current weight is 137,    Obtaining outside labs:               TSH: 0.005, FT4 6.10   Symptoms: had palpitations and tremors which are improving however      05/24/2021: Methimazole was resumed in April of 2021- has been taking 30mg since that time. Had sx in November with weight loss/tachycardia. No interest in pursuing fertility at this time.       08/06/2021:Free T4 increased reducing from 10mg to 5mg, asked her to take 7.5mg instead. Has been on 7.5mg for about 2 weeks, has labs pending mid Aug. Went to [place] recently for work. HR has been  65-68, 72 highest in past month. When on the 5mg had started to lose weight. Hasn't gained it all back.      02/14/2022: Wrote letter 01/15 recommending increase to 10mg- has been on this for 2 weeks. Was feeling good on 7.5mg, notes the fact that  it is difficult for her and hyperthyroidism but that tremor is typically a predominant symptom. Is trying to be more consistent with taking methimazole.       06/20/2022: Got a pill box so she

## 2024-01-04 LAB
ALBUMIN SERPL-MCNC: 4.4 G/DL (ref 3.9–4.9)
ALP SERPL-CCNC: 38 IU/L (ref 44–121)
ALT SERPL-CCNC: 13 IU/L (ref 0–32)
AST SERPL-CCNC: 18 IU/L (ref 0–40)
BILIRUB DIRECT SERPL-MCNC: <0.1 MG/DL (ref 0–0.4)
BILIRUB SERPL-MCNC: <0.2 MG/DL (ref 0–1.2)
PROT SERPL-MCNC: 6.7 G/DL (ref 6–8.5)
T3 SERPL-MCNC: 92 NG/DL (ref 71–180)
T4 FREE SERPL-MCNC: 1.08 NG/DL (ref 0.82–1.77)
TSH SERPL DL<=0.005 MIU/L-ACNC: 1.39 UIU/ML (ref 0.45–4.5)
TSI SER-ACNC: 5.07 IU/L (ref 0–0.55)
WBC # BLD AUTO: 5.7 X10E3/UL (ref 3.4–10.8)

## 2024-01-05 DIAGNOSIS — E05.00 THYROTOXICOSIS WITH DIFFUSE GOITER WITHOUT THYROTOXIC CRISIS OR STORM: Primary | ICD-10-CM

## 2024-01-05 RX ORDER — METHIMAZOLE 5 MG/1
5 TABLET ORAL DAILY
Qty: 90 TABLET | Refills: 2 | Status: SHIPPED | OUTPATIENT
Start: 2024-01-05

## 2024-02-07 RX ORDER — BUPROPION HYDROCHLORIDE 150 MG/1
150 TABLET ORAL EVERY MORNING
Qty: 90 TABLET | Refills: 1 | Status: SHIPPED | OUTPATIENT
Start: 2024-02-07

## 2024-07-02 ENCOUNTER — TELEPHONE (OUTPATIENT)
Age: 39
End: 2024-07-02

## 2024-07-02 ENCOUNTER — OFFICE VISIT (OUTPATIENT)
Age: 39
End: 2024-07-02
Payer: COMMERCIAL

## 2024-07-02 VITALS
WEIGHT: 184 LBS | HEART RATE: 67 BPM | HEIGHT: 64 IN | BODY MASS INDEX: 31.41 KG/M2 | DIASTOLIC BLOOD PRESSURE: 68 MMHG | SYSTOLIC BLOOD PRESSURE: 104 MMHG | OXYGEN SATURATION: 99 % | RESPIRATION RATE: 16 BRPM

## 2024-07-02 DIAGNOSIS — E05.00 THYROTOXICOSIS WITH DIFFUSE GOITER WITHOUT THYROTOXIC CRISIS OR STORM: Primary | ICD-10-CM

## 2024-07-02 PROCEDURE — 99214 OFFICE O/P EST MOD 30 MIN: CPT | Performed by: INTERNAL MEDICINE

## 2024-07-02 PROCEDURE — G2211 COMPLEX E/M VISIT ADD ON: HCPCS | Performed by: INTERNAL MEDICINE

## 2024-07-02 NOTE — PROGRESS NOTES
isn't missing doses- it is in a central place. Still a little tremor, checks with paper towel, improved-  depends on when she does it.There are times she feels sluggish tired, weight stays consistent. Those types of things make her think it's too much. Hard to differentiate life vs sx. Thought for sure TSH would be high. Going to [facility] with family, son and   went on a backpacking trip at [facility].      01/13/2023: Feels like herself again on phentermine- not having anxiety on it. Methimazole is 10mg daily, no other concerns. Taking biotin  for hair- rapid hair loss has slowed down. Handfuls are not coming out anymore.  had a vascectomy. Kids are 12/11/3- starting potty training in daughter.  Cravings for alcohol and chocolate have reduced significantly with phentermine, has lost  10 pounds.    07/13/2023: Taking phentermine 37.5mg as prescribed by PCP- eating very little. Tolerating well from tremor/palpitation standpoint. Taking MMI 5mg.    01/02/2023: Stopped phentermine in the past week and a half- was right over [place].  Had dropped down to 15mg- 37.5mg was too much, jittery and edgy. Weight currently at 160. Likes to try to stay between 150 and 160, closer to 150 if possible. 1 year old puppy, gets to 10k steps per day.      07/02/2024: Not taking wellbutrin- gave her a bad headache.154-->184lbs since stopping phentermine.       Current Outpatient Medications:     methIMAzole (TAPAZOLE) 5 MG tablet, Take 1 tablet by mouth daily, Disp: 90 tablet, Rfl: 2    Biotin 2.5 MG CAPS, Take 1 tablet by mouth daily, Disp: , Rfl:      Social Hx:11-boy/9- girl/2-girl year olds           Review of Systems:    See HPI      Physical Examination:      Visit Vitals  /68   Pulse 67   Resp 16   Ht 1.626 m (5' 4\")   Wt 83.5 kg (184 lb)   SpO2 99%   BMI 31.58 kg/m²         - GENERAL: NCAT, Appears well nourished   - EYES: EOMI, non-icteric, no proptosis   - Ear/Nose/Throat:

## 2024-07-03 LAB
ALBUMIN SERPL-MCNC: 4.4 G/DL (ref 3.9–4.9)
ALP SERPL-CCNC: 45 IU/L (ref 44–121)
ALT SERPL-CCNC: 12 IU/L (ref 0–32)
AST SERPL-CCNC: 15 IU/L (ref 0–40)
BILIRUB DIRECT SERPL-MCNC: 0.12 MG/DL (ref 0–0.4)
BILIRUB SERPL-MCNC: 0.4 MG/DL (ref 0–1.2)
PROT SERPL-MCNC: 7.3 G/DL (ref 6–8.5)
T3 SERPL-MCNC: 94 NG/DL (ref 71–180)
T4 FREE SERPL-MCNC: 1.16 NG/DL (ref 0.82–1.77)
TSH SERPL DL<=0.005 MIU/L-ACNC: 0.68 UIU/ML (ref 0.45–4.5)
WBC # BLD AUTO: 4 X10E3/UL (ref 3.4–10.8)

## 2024-07-04 LAB — TSI SER-ACNC: 5.61 IU/L (ref 0–0.55)

## 2024-07-05 DIAGNOSIS — E05.00 THYROTOXICOSIS WITH DIFFUSE GOITER WITHOUT THYROTOXIC CRISIS OR STORM: Primary | ICD-10-CM

## 2024-08-06 ENCOUNTER — TELEPHONE (OUTPATIENT)
Age: 39
End: 2024-08-06

## 2025-01-30 LAB
ERYTHROCYTE [DISTWIDTH] IN BLOOD BY AUTOMATED COUNT: 11.2 % (ref 11.7–15.4)
HCT VFR BLD AUTO: 41 % (ref 34–46.6)
HGB BLD-MCNC: 13.4 G/DL (ref 11.1–15.9)
MCH RBC QN AUTO: 29.5 PG (ref 26.6–33)
MCHC RBC AUTO-ENTMCNC: 32.7 G/DL (ref 31.5–35.7)
MCV RBC AUTO: 90 FL (ref 79–97)
PLATELET # BLD AUTO: 258 X10E3/UL (ref 150–450)
RBC # BLD AUTO: 4.55 X10E6/UL (ref 3.77–5.28)
TSI SER-ACNC: 3.56 IU/L (ref 0–0.55)
WBC # BLD AUTO: 9.3 X10E3/UL (ref 3.4–10.8)

## 2025-01-31 LAB
ALBUMIN SERPL-MCNC: 4.8 G/DL (ref 3.9–4.9)
ALP SERPL-CCNC: 42 IU/L (ref 44–121)
ALT SERPL-CCNC: 27 IU/L (ref 0–32)
AST SERPL-CCNC: 59 IU/L (ref 0–40)
BILIRUB DIRECT SERPL-MCNC: 0.15 MG/DL (ref 0–0.4)
BILIRUB SERPL-MCNC: 0.4 MG/DL (ref 0–1.2)
PROT SERPL-MCNC: 7.3 G/DL (ref 6–8.5)
T3 SERPL-MCNC: 121 NG/DL (ref 71–180)
T4 FREE SERPL-MCNC: 1.3 NG/DL (ref 0.82–1.77)
TSH SERPL DL<=0.005 MIU/L-ACNC: 0.9 UIU/ML (ref 0.45–4.5)

## 2025-03-03 ENCOUNTER — OFFICE VISIT (OUTPATIENT)
Age: 40
End: 2025-03-03
Payer: COMMERCIAL

## 2025-03-03 ENCOUNTER — TELEPHONE (OUTPATIENT)
Age: 40
End: 2025-03-03

## 2025-03-03 VITALS
SYSTOLIC BLOOD PRESSURE: 112 MMHG | DIASTOLIC BLOOD PRESSURE: 67 MMHG | HEART RATE: 80 BPM | WEIGHT: 187.6 LBS | RESPIRATION RATE: 16 BRPM | OXYGEN SATURATION: 99 % | BODY MASS INDEX: 32.03 KG/M2 | HEIGHT: 64 IN

## 2025-03-03 DIAGNOSIS — E05.00 THYROTOXICOSIS WITH DIFFUSE GOITER WITHOUT THYROTOXIC CRISIS OR STORM: Primary | ICD-10-CM

## 2025-03-03 PROCEDURE — G8428 CUR MEDS NOT DOCUMENT: HCPCS | Performed by: INTERNAL MEDICINE

## 2025-03-03 PROCEDURE — 1036F TOBACCO NON-USER: CPT | Performed by: INTERNAL MEDICINE

## 2025-03-03 PROCEDURE — G8417 CALC BMI ABV UP PARAM F/U: HCPCS | Performed by: INTERNAL MEDICINE

## 2025-03-03 PROCEDURE — 99214 OFFICE O/P EST MOD 30 MIN: CPT | Performed by: INTERNAL MEDICINE

## 2025-03-03 PROCEDURE — G2211 COMPLEX E/M VISIT ADD ON: HCPCS | Performed by: INTERNAL MEDICINE

## 2025-03-03 RX ORDER — M-VIT,TX,IRON,MINS/CALC/FOLIC 27MG-0.4MG
1 TABLET ORAL DAILY
COMMUNITY

## 2025-03-03 RX ORDER — METHIMAZOLE 5 MG/1
5 TABLET ORAL DAILY
Qty: 90 TABLET | Refills: 2 | Status: SHIPPED | OUTPATIENT
Start: 2025-03-03

## 2025-03-03 NOTE — PROGRESS NOTES
Chief Complaint   Patient presents with    Thyroid Problem    Medication Refill           History of Present Illness: Мария Louie is a  39 y.o.. female with a past medical hx significant for Grave's disease presenting in follow  up for continued discussion regarding hypERthyroidism.       Previously followed with Manuel:   + TSI / TPO / TgAb 's, suggestive of graves disease, initially noted in pregnancy.    We had started her on methimazole with taper.   Previously had a baby girl June 25th, Celia,   She was last seen over 1 year ago   On that visit I had recommended the following:                10mg (2 tabs) once daily for 4 weeks, then,               1 and a half tablets (7.5mg) daily thereafter,   Last summer she did not continue the methimazole,   In Nov 2020 she started eating healthy and working out,   She lost a lot of weight however,    Her PCP obtained labs a few weeks ago,    She was started on methimazole 20 mg once daily, using 5mg tabs,   Her current weight is 137,    Obtaining outside labs:               TSH: 0.005, FT4 6.10   Symptoms: had palpitations and tremors which are improving however      05/24/2021: Methimazole was resumed in April of 2021- has been taking 30mg since that time. Had sx in November with weight loss/tachycardia. No interest in pursuing fertility at this time.       08/06/2021:Free T4 increased reducing from 10mg to 5mg, asked her to take 7.5mg instead. Has been on 7.5mg for about 2 weeks, has labs pending mid Aug. Went to [place] recently for work. HR has been  65-68, 72 highest in past month. When on the 5mg had started to lose weight. Hasn't gained it all back.      02/14/2022: Wrote letter 01/15 recommending increase to 10mg- has been on this for 2 weeks. Was feeling good on 7.5mg, notes the fact that  it is difficult for her and hyperthyroidism but that tremor is typically a predominant symptom. Is trying to be more consistent with taking methimazole.       06/20/2022: Got

## 2025-03-03 NOTE — TELEPHONE ENCOUNTER
Pt called and stated she was interested in our [facility] program. Sent pt zoom link and next steps via email.

## 2025-05-27 NOTE — PROGRESS NOTES
Chief Complaint   Patient presents with    Follow-up    Thyroid Problem    Medication Refill           History of Present Illness: Gita Butts is a  30 y.o. [facility] female with a past medical hx significant for Grave's disease presenting in follow  up for continued discussion regarding hypERthyroidism. Previously followed with Vitaliy Akbar:   + TSI / TPO / TgAb 's, suggestive of graves disease, initially noted in pregnancy. We had started her on methimazole with taper. Previously had a baby girl June 25th, Celia,   She was last seen over 1 year ago   On that visit I had recommended the following:                10mg (2 tabs) once daily for 4 weeks, then,               1 and a half tablets (7.5mg) daily thereafter,   Last summer she did not continue the methimazole,   In Nov 2020 she started eating healthy and working out,   She lost a lot of weight however,    Her PCP obtained labs a few weeks ago,    She was started on methimazole 20 mg once daily, using 5mg tabs,   Her current weight is 137,    Obtaining outside labs:               TSH: 0.005, FT4 6.10   Symptoms: had palpitations and tremors which are improving however      05/24/2021: Methimazole was resumed in April of 2021- has been taking 30mg since that time. Had sx in November with weight loss/tachycardia. No interest in pursuing fertility at this time. 08/06/2021:Free T4 increased reducing from 10mg to 5mg, asked her to take 7.5mg instead. Has been on 7.5mg for about 2 weeks, has labs pending mid Aug. Went to [place] recently for work. HR has been  65-68, 72 highest in past month. When on the 5mg had started to lose weight. Hasn't gained it all back. 02/14/2022: Wrote letter 01/15 recommending increase to 10mg- has been on this for 2 weeks. Was feeling good on 7.5mg, notes the fact that  it is difficult for her and hyperthyroidism but that tremor is typically a predominant symptom. Is trying to be more consistent with taking methimazole. Female

## 2025-08-07 LAB
ALBUMIN SERPL-MCNC: 4.7 G/DL (ref 3.9–4.9)
ALP SERPL-CCNC: 47 IU/L (ref 44–121)
ALT SERPL-CCNC: 15 IU/L (ref 0–32)
AST SERPL-CCNC: 22 IU/L (ref 0–40)
BASOPHILS # BLD AUTO: 0 X10E3/UL (ref 0–0.2)
BASOPHILS NFR BLD AUTO: 0 %
BILIRUB DIRECT SERPL-MCNC: 0.14 MG/DL (ref 0–0.4)
BILIRUB SERPL-MCNC: 0.5 MG/DL (ref 0–1.2)
EOSINOPHIL # BLD AUTO: 0.1 X10E3/UL (ref 0–0.4)
EOSINOPHIL NFR BLD AUTO: 1 %
ERYTHROCYTE [DISTWIDTH] IN BLOOD BY AUTOMATED COUNT: 11.7 % (ref 11.7–15.4)
HCT VFR BLD AUTO: 45.1 % (ref 34–46.6)
HGB BLD-MCNC: 14.3 G/DL (ref 11.1–15.9)
IMM GRANULOCYTES # BLD AUTO: 0 X10E3/UL (ref 0–0.1)
IMM GRANULOCYTES NFR BLD AUTO: 0 %
LYMPHOCYTES # BLD AUTO: 1.7 X10E3/UL (ref 0.7–3.1)
LYMPHOCYTES NFR BLD AUTO: 17 %
MCH RBC QN AUTO: 29.5 PG (ref 26.6–33)
MCHC RBC AUTO-ENTMCNC: 31.7 G/DL (ref 31.5–35.7)
MCV RBC AUTO: 93 FL (ref 79–97)
MONOCYTES # BLD AUTO: 0.4 X10E3/UL (ref 0.1–0.9)
MONOCYTES NFR BLD AUTO: 4 %
NEUTROPHILS # BLD AUTO: 8 X10E3/UL (ref 1.4–7)
NEUTROPHILS NFR BLD AUTO: 78 %
PLATELET # BLD AUTO: 293 X10E3/UL (ref 150–450)
PROT SERPL-MCNC: 7.1 G/DL (ref 6–8.5)
RBC # BLD AUTO: 4.85 X10E6/UL (ref 3.77–5.28)
T3 SERPL-MCNC: 92 NG/DL (ref 71–180)
T4 FREE SERPL-MCNC: 1.32 NG/DL (ref 0.82–1.77)
TSH SERPL DL<=0.005 MIU/L-ACNC: 1.49 UIU/ML (ref 0.45–4.5)
TSI SER-ACNC: 3.44 IU/L (ref 0–0.55)
WBC # BLD AUTO: 10.1 X10E3/UL (ref 3.4–10.8)

## 2025-08-11 ENCOUNTER — OFFICE VISIT (OUTPATIENT)
Age: 40
End: 2025-08-11
Payer: COMMERCIAL

## 2025-08-11 VITALS
RESPIRATION RATE: 16 BRPM | TEMPERATURE: 98.4 F | DIASTOLIC BLOOD PRESSURE: 70 MMHG | WEIGHT: 179 LBS | SYSTOLIC BLOOD PRESSURE: 116 MMHG | HEART RATE: 74 BPM | OXYGEN SATURATION: 98 % | BODY MASS INDEX: 30.56 KG/M2 | HEIGHT: 64 IN

## 2025-08-11 DIAGNOSIS — E05.00 THYROTOXICOSIS WITH DIFFUSE GOITER WITHOUT THYROTOXIC CRISIS OR STORM: Primary | ICD-10-CM

## 2025-08-11 PROCEDURE — 99214 OFFICE O/P EST MOD 30 MIN: CPT | Performed by: INTERNAL MEDICINE

## 2025-08-11 PROCEDURE — 1036F TOBACCO NON-USER: CPT | Performed by: INTERNAL MEDICINE

## 2025-08-11 PROCEDURE — G2211 COMPLEX E/M VISIT ADD ON: HCPCS | Performed by: INTERNAL MEDICINE

## 2025-08-11 PROCEDURE — G8417 CALC BMI ABV UP PARAM F/U: HCPCS | Performed by: INTERNAL MEDICINE

## 2025-08-11 PROCEDURE — G8427 DOCREV CUR MEDS BY ELIG CLIN: HCPCS | Performed by: INTERNAL MEDICINE

## 2025-08-11 ASSESSMENT — LIFESTYLE VARIABLES
HOW MANY STANDARD DRINKS CONTAINING ALCOHOL DO YOU HAVE ON A TYPICAL DAY: 1 OR 2
HOW OFTEN DO YOU HAVE A DRINK CONTAINING ALCOHOL: 2-3 TIMES A WEEK

## (undated) DEVICE — MASTISOL ADHESIVE LIQ 2/3ML

## (undated) DEVICE — DRESSING,GAUZE,XEROFORM,CURAD,1"X8",ST: Brand: CURAD

## (undated) DEVICE — SUT PROL 3-0 36IN SH DA BLU --

## (undated) DEVICE — SYR 10ML LUER LOK 1/5ML GRAD --

## (undated) DEVICE — SUTURE VCRL SZ 3-0 L27IN ABSRB UD L24MM FS-1 3/8 CIR REV J442H

## (undated) DEVICE — SYSTEM IMPL DEL FOR BRST IMPL FUN (SEE COMMENT)

## (undated) DEVICE — GLOVE ORANGE PI 7 1/2   MSG9075

## (undated) DEVICE — GARMENT,MEDLINE,DVT,INT,CALF,MED, GEN2: Brand: MEDLINE

## (undated) DEVICE — NEEDLE HYPO 22GA L1.5IN BLK POLYPR HUB S STL REG BVL STR

## (undated) DEVICE — SUTURE MCRYL SZ 4-0 L27IN ABSRB UD L19MM PS-2 1/2 CIR PRIM Y426H

## (undated) DEVICE — PAD,ABDOMINAL,5"X9",ST,LF,25/BX: Brand: MEDLINE INDUSTRIES, INC.

## (undated) DEVICE — BLADE ELECTRODE: Brand: EDGE

## (undated) DEVICE — STRIP,CLOSURE,WOUND,MEDI-STRIP,1/2X4: Brand: MEDLINE

## (undated) DEVICE — Device

## (undated) DEVICE — SOLUTION IRRIG 1000ML 0.9% SOD CHL USP POUR PLAS BTL

## (undated) DEVICE — 450 ML BOTTLE OF 0.05% CHLORHEXIDINE GLUCONATE IN 99.95% STERILE WATER FOR IRRIGATION, USP AND APPLICATOR.: Brand: IRRISEPT ANTIMICROBIAL WOUND LAVAGE

## (undated) DEVICE — PENCIL SMK EVAC L10FT DIA95MM TBNG NONSTICK W ADPT TO 22MM

## (undated) DEVICE — SPONGE GZ W4XL4IN COT 12 PLY TYP VII WVN C FLD DSGN

## (undated) DEVICE — MARKER,SKIN,WI/RULER AND LABELS: Brand: MEDLINE

## (undated) DEVICE — PLASTICS CHEST BREAST ASU: Brand: MEDLINE INDUSTRIES, INC.

## (undated) DEVICE — PREMIUM WET SKIN PREP TRAY: Brand: MEDLINE INDUSTRIES, INC.